# Patient Record
Sex: FEMALE | NOT HISPANIC OR LATINO | Employment: UNEMPLOYED | URBAN - NONMETROPOLITAN AREA
[De-identification: names, ages, dates, MRNs, and addresses within clinical notes are randomized per-mention and may not be internally consistent; named-entity substitution may affect disease eponyms.]

---

## 2022-02-07 ENCOUNTER — OFFICE VISIT (OUTPATIENT)
Dept: FAMILY MEDICINE | Facility: OTHER | Age: 37
End: 2022-02-07
Attending: FAMILY MEDICINE
Payer: COMMERCIAL

## 2022-02-07 VITALS
DIASTOLIC BLOOD PRESSURE: 62 MMHG | WEIGHT: 181.6 LBS | HEART RATE: 72 BPM | SYSTOLIC BLOOD PRESSURE: 98 MMHG | OXYGEN SATURATION: 98 % | TEMPERATURE: 98.4 F

## 2022-02-07 DIAGNOSIS — N91.2 AMENORRHEA: ICD-10-CM

## 2022-02-07 DIAGNOSIS — F33.1 MODERATE EPISODE OF RECURRENT MAJOR DEPRESSIVE DISORDER (H): ICD-10-CM

## 2022-02-07 DIAGNOSIS — E55.9 HYPOVITAMINOSIS D: ICD-10-CM

## 2022-02-07 DIAGNOSIS — F41.1 GAD (GENERALIZED ANXIETY DISORDER): ICD-10-CM

## 2022-02-07 DIAGNOSIS — R79.89 LOW SERUM PROGESTERONE: ICD-10-CM

## 2022-02-07 DIAGNOSIS — O09.891 SUPERVISION OF OTHER HIGH RISK PREGNANCIES, FIRST TRIMESTER: Primary | ICD-10-CM

## 2022-02-07 DIAGNOSIS — O21.0 MORNING SICKNESS: ICD-10-CM

## 2022-02-07 LAB
HCG UR QL: POSITIVE
PROGEST SERPL-MCNC: 33.3 NG/ML

## 2022-02-07 PROCEDURE — G0463 HOSPITAL OUTPT CLINIC VISIT: HCPCS

## 2022-02-07 PROCEDURE — 99204 OFFICE O/P NEW MOD 45 MIN: CPT | Performed by: FAMILY MEDICINE

## 2022-02-07 PROCEDURE — 81025 URINE PREGNANCY TEST: CPT | Mod: ZL | Performed by: FAMILY MEDICINE

## 2022-02-07 PROCEDURE — 82306 VITAMIN D 25 HYDROXY: CPT | Mod: ZL | Performed by: FAMILY MEDICINE

## 2022-02-07 PROCEDURE — 36415 COLL VENOUS BLD VENIPUNCTURE: CPT | Mod: ZL | Performed by: FAMILY MEDICINE

## 2022-02-07 PROCEDURE — 84144 ASSAY OF PROGESTERONE: CPT | Mod: ZL | Performed by: FAMILY MEDICINE

## 2022-02-07 RX ORDER — LAMOTRIGINE 25 MG/1
TABLET ORAL
COMMUNITY
Start: 2021-03-11 | End: 2022-03-16

## 2022-02-07 RX ORDER — ONDANSETRON 4 MG/1
4 TABLET, ORALLY DISINTEGRATING ORAL EVERY 8 HOURS PRN
Qty: 20 TABLET | Refills: 1 | Status: ON HOLD | OUTPATIENT
Start: 2022-02-07 | End: 2022-08-11

## 2022-02-07 RX ORDER — PRENATAL VIT/IRON FUM/FOLIC AC 27MG-0.8MG
1 TABLET ORAL DAILY
COMMUNITY
End: 2024-02-29

## 2022-02-07 RX ORDER — LAMOTRIGINE 100 MG/1
100 TABLET ORAL DAILY
COMMUNITY
Start: 2021-05-24 | End: 2022-03-16

## 2022-02-07 RX ORDER — QUETIAPINE FUMARATE 50 MG/1
TABLET, EXTENDED RELEASE ORAL
COMMUNITY
Start: 2021-05-24 | End: 2022-08-11

## 2022-02-07 RX ORDER — ESCITALOPRAM OXALATE 5 MG/1
5 TABLET ORAL DAILY
Qty: 30 TABLET | Refills: 1 | Status: ON HOLD | OUTPATIENT
Start: 2022-02-07 | End: 2022-08-11

## 2022-02-07 ASSESSMENT — ANXIETY QUESTIONNAIRES
4. TROUBLE RELAXING: NOT AT ALL
1. FEELING NERVOUS, ANXIOUS, OR ON EDGE: NEARLY EVERY DAY
3. WORRYING TOO MUCH ABOUT DIFFERENT THINGS: NOT AT ALL
5. BEING SO RESTLESS THAT IT IS HARD TO SIT STILL: NOT AT ALL
7. FEELING AFRAID AS IF SOMETHING AWFUL MIGHT HAPPEN: SEVERAL DAYS
6. BECOMING EASILY ANNOYED OR IRRITABLE: NOT AT ALL
2. NOT BEING ABLE TO STOP OR CONTROL WORRYING: SEVERAL DAYS
GAD7 TOTAL SCORE: 5
IF YOU CHECKED OFF ANY PROBLEMS ON THIS QUESTIONNAIRE, HOW DIFFICULT HAVE THESE PROBLEMS MADE IT FOR YOU TO DO YOUR WORK, TAKE CARE OF THINGS AT HOME, OR GET ALONG WITH OTHER PEOPLE: VERY DIFFICULT

## 2022-02-07 ASSESSMENT — LIFESTYLE VARIABLES
HOW OFTEN DO YOU HAVE A DRINK CONTAINING ALCOHOL: MONTHLY OR LESS
HOW MANY STANDARD DRINKS CONTAINING ALCOHOL DO YOU HAVE ON A TYPICAL DAY: 1 OR 2

## 2022-02-07 ASSESSMENT — PATIENT HEALTH QUESTIONNAIRE - PHQ9: SUM OF ALL RESPONSES TO PHQ QUESTIONS 1-9: 13

## 2022-02-07 ASSESSMENT — PAIN SCALES - GENERAL: PAINLEVEL: NO PAIN (0)

## 2022-02-07 NOTE — PROGRESS NOTES
Assessment & Plan     Supervision of other high risk pregnancies, first trimester  11wks GA today  GHULAM 8/29/22  - US OB <14 Weeks w Transvaginal Single; Future    Amenorrhea    - HCG Qual, Urine ([id number]); Future  - HCG Qual, Urine ([id number])    Morning sickness  Was using THC  - ondansetron (ZOFRAN-ODT) 4 MG ODT tab; Take 1 tablet (4 mg) by mouth every 8 hours as needed for nausea    Moderate episode of recurrent major depressive disorder (H)  restart  - escitalopram (LEXAPRO) 5 MG tablet; Take 1 tablet (5 mg) by mouth daily    JESSI (generalized anxiety disorder)    - escitalopram (LEXAPRO) 5 MG tablet; Take 1 tablet (5 mg) by mouth daily    Hypovitaminosis D    - Vitamin D Deficiency; Future  - Vitamin D Deficiency    Low serum progesterone    - Progesterone; Future  - Progesterone    30 minutes spent on the date of the encounter doing chart review, history and exam, documentation and further activities per the note       Tobacco Cessation:   reports that she has been smoking cigarettes. She started smoking about 24 years ago. She has a 8.00 pack-year smoking history. She has never used smokeless tobacco.  Tobacco Cessation Action Plan: Information offered: Patient not interested at this time    Patient was agreeable to this plan and had no further questions.  There are no Patient Instructions on file for this visit.    No follow-ups on file.    Ellne Flannery MD  [facility] - YADIRA Gant is a 36 year old who presents for the following health issues     HPI     Depression and Anxiety     How are you doing with your depression since your last visit? Worsened     How are you doing with your anxiety since your last visit?  Worsened     Are you having other symptoms that might be associated with depression or anxiety? Yes:  Little interest in doing things, fatigue, feeling depressed.     Have you had a significant life event? Grief or Loss     Do you have any concerns with your use of  alcohol or other drugs? No    Social History     Tobacco Use     Smoking status: Not on file     Smokeless tobacco: Not on file   Substance Use Topics     Alcohol use: Not on file     Drug use: Not on file     PHQ 2/7/2022   PHQ-9 Total Score 13   Q9: Thoughts of better off dead/self-harm past 2 weeks Not at all     JESSI-7 SCORE 2/7/2022   Total Score 5     Last PHQ-9 2/7/2022   1.  Little interest or pleasure in doing things 0   2.  Feeling down, depressed, or hopeless 3   3.  Trouble falling or staying asleep, or sleeping too much 3   4.  Feeling tired or having little energy 3   5.  Poor appetite or overeating 3   6.  Feeling bad about yourself 0   7.  Trouble concentrating 1   8.  Moving slowly or restless 0   Q9: Thoughts of better off dead/self-harm past 2 weeks 0   PHQ-9 Total Score 13   Difficulty at work, home, or with people Extremely dIfficult     JESSI-7  2/7/2022   1. Feeling nervous, anxious, or on edge 3   2. Not being able to stop or control worrying 1   3. Worrying too much about different things 0   4. Trouble relaxing 0   5. Being so restless that it is hard to sit still 0   6. Becoming easily annoyed or irritable 0   7. Feeling afraid, as if something awful might happen 1   JESSI-7 Total Score 5   If you checked any problems, how difficult have they made it for you to do your work, take care of things at home, or get along with other people? Very difficult       Suicide Assessment Five-step Evaluation and Treatment (SAFE-T)      Concern - Amenorrhea   Onset: LMP was End of November, exact date unkown.     Review of Systems   Constitutional, HEENT, cardiovascular, pulmonary, GI, , musculoskeletal, neuro, skin, endocrine and psych systems are negative, except as otherwise noted.      Objective    LMP 11/22/2021 (Approximate)   There is no height or weight on file to calculate BMI.  Physical Exam   GENERAL: healthy, alert and no distress  NECK: no adenopathy, no asymmetry, masses, or scars and thyroid  normal to palpation  RESP: lungs clear to auscultation - no rales, rhonchi or wheezes  CV: regular rate and rhythm, normal S1 S2, no S3 or S4, no murmur, click or rub, no peripheral edema and peripheral pulses strong  ABDOMEN: soft, nontender, no hepatosplenomegaly, no masses and bowel sounds normal  MS: no gross musculoskeletal defects noted, no edema  PSYCH: mentation appears normal, affect normal/bright    Results for orders placed or performed in visit on 02/07/22   HCG Qual, Urine ([id number])     Status: Abnormal   Result Value Ref Range    hCG Urine Qualitative Positive (A) Negative

## 2022-02-07 NOTE — NURSING NOTE
Chief Complaint   Patient presents with     Amenorrhea       Initial BP 98/62   Pulse 72   Temp 98.4  F (36.9  C) (Tympanic)   Wt 82.4 kg (181 lb 9.6 oz)   LMP 11/22/2021 (Approximate)   SpO2 98%  There is no height or weight on file to calculate BMI.  Medication Reconciliation: complete  Galen Vidales LPN

## 2022-02-08 LAB — DEPRECATED CALCIDIOL+CALCIFEROL SERPL-MC: 16 UG/L (ref 20–75)

## 2022-02-08 ASSESSMENT — ANXIETY QUESTIONNAIRES: GAD7 TOTAL SCORE: 5

## 2022-02-09 DIAGNOSIS — O09.891 SUPERVISION OF OTHER HIGH RISK PREGNANCIES, FIRST TRIMESTER: Primary | ICD-10-CM

## 2022-02-10 RX ORDER — CHOLECALCIFEROL (VITAMIN D3) 50 MCG
3 TABLET ORAL DAILY
Qty: 90 TABLET | Refills: 1 | Status: SHIPPED | OUTPATIENT
Start: 2022-02-10 | End: 2023-10-10

## 2022-02-10 RX ORDER — PROGESTERONE 100 MG/1
100 CAPSULE ORAL DAILY
Qty: 60 CAPSULE | Refills: 1 | Status: SHIPPED | OUTPATIENT
Start: 2022-02-10 | End: 2022-06-02 | Stop reason: ALTCHOICE

## 2022-03-15 NOTE — PROGRESS NOTES
Have you had or do you currently have:    - Diabetes? No  - Hypertension? No  - Heart disease, mitral valve prolapse, or rheumatic fever?  No  - An autoimmune disease such as lupus or rheumatoid arthritis?  No  - Kidney disease, urinary tract infection?  No  - Epilepsy, seizures, or spells?  No  - Migraine headaches?  No  - Any other neurological problems?  No  - Have you ever been treated for depression?  Yes  - Are you having problems with crying spells or loss of self-esteem?  Yes  - Have you ever required psychiatric care?  Yes  - Have you ever had hepatitis, liver disease, or jaundice?  No  - Have you ever been treated for blood clots in your veins, deep vein thrombosis, inflammation in the veins, thrombosis, phelbitis, pulmonary embolism or varicosities?  No  - Have you had excessive bleeding after surgery or dental work?  No  - Do you bleed more than other women after a cut or scratch?  No  - Do you have a history or anemia?  No  - Have you ever had thyroid problems or take thyroid medication?  No  - Do you have any endocrine problems?  No  - Have you every been in a major accident or suffered serious trauma?  No  - Within the last year, has anyone hit, slapped, kicked, or otherwise hurt you?  No  - In the last year, has anyone forced you to have sex when you didn't want to?  No  - Have you every received a blood transfusion?  No  - Would you refuse a blood transfusion if a doctor judged it to be medically necessary?  No  - Does anyone in your home smoke? Sometimes  - Do you use tobacco products?  Yes, Trying to quit  - Do you drink beer, wine, or hard liquor?  No  - Do you use any of the following: marijuana, speed, cocaine, heroin, hallucinogens, or other drugs?  No  - Is your blood type RH negative?  No  - Have you ever had asthma?  No  - Have you ever had tuberculosis?  No  - Do you have any allergies to drugs or over-the-counter medications?  No  - Allergies: dust mites, aspartame, ethanol, venlafaxine  hydrochloride, sertraline?  No  - Have you had any breast problems?  No  - Have you ever breast-fed?  Yes  - Have you had any gynecological surgical procedures such as cervical conization, LEEP, laser treatment, cryosurgery of the cervix, or a dilatation and curettage, etc?  No  - Have you ever had any other surgical procedures?  No  - Have you ever had any anesthetic complications?  No  - Have you ever had an abnormal pap smear?  Unknown   - Do you have a history of abnormalities of the uterus? Unknown   - Did your mother take ELISE or any other hormones when she was pregnant with you?  Unknown  - Did it take you more than one year to become pregnant?  No  - Have you ever been evaluated or treated for infertility?  No  - Is there a history of medical problems in your family, which you feel might adversely affect your health or pregnancy?  No  - Do you have any other problems we have not asked about which you feel may be important to this pregnancy?  No    Symptoms since last menstrual period  - Do you currently have any of the following symptoms: abdominal pain, blood in the stool or urine, chest pain, shortness of breath, coughing or vomiting up blood, you heart is racing or skipping beats, nausea and vomiting, pain on urination, or vaginal discharge or bleeding?  No    Genetic screening  Has the patient, baby's father, or anyone in either family had:  - Thalassemia (Italian, Greek, Mediterranean, or  background only) and an MCV result less than 80?  No  - Neural tube defect such as meningomyelocele, spina bifida, or anencephaly?  No  - Congential heart defect?  No  - Down's syndrome?  No  - Chris-Sachs disease ( Buddhism, Cajun, Japanese-[place])?  No  - Sickle cell disease or trait () ?  No  - Hemophilia or other inherited problems of blood?  No  - Muscular dystrophy?  No  - Cystic fibrosis?  No  - Orange's chorea?  No  - Mental retardation/autism?  No   If yes, was the person tested for Fragile X?   No  - Any other inherited genetic or chromosomal disorder?  No  - Maternal metabolic disorder (e.g. insulin- dependent diabetes, PKU)?  No  - A child with birth defects not listed above?  No  - Recurrent pregnancy loss, or a stillbirth?  No  - Has the patient had any medications/street drugs/alcohol since her last menstrual period?  No  - Does the patient or baby's father have any other genetic risk?  No    Infection history  - Have you ever been treated for tuberculosis?  No  - Have you every had a positive skin test for tuberculosis?  No  - Do you live with someone who has tuberculosis?  No  - Have you ever been exposed to tuberculosis?  No  - Do you have genital herpes?  No  - Does your partner have genital herpes?  No  - Have you had a rash or viral illness since your last period?  No  - Have you ever had gonorrhea, chlamydia, syphilis, venereal warts, trichomoniasis, pelvic inflammatory disease, or any other sexually transmitted disease?  Yes, Trichomoniasis  - Have you had chicken pox?  Yes  - Have you been vaccinated against chicken pox?  Yes  - Have you had any other infectious diseases?  No

## 2022-03-16 ENCOUNTER — PRENATAL OFFICE VISIT (OUTPATIENT)
Dept: FAMILY MEDICINE | Facility: OTHER | Age: 37
End: 2022-03-16
Attending: FAMILY MEDICINE
Payer: COMMERCIAL

## 2022-03-16 ENCOUNTER — LAB (OUTPATIENT)
Dept: LAB | Facility: OTHER | Age: 37
End: 2022-03-16
Attending: FAMILY MEDICINE
Payer: COMMERCIAL

## 2022-03-16 VITALS
DIASTOLIC BLOOD PRESSURE: 62 MMHG | OXYGEN SATURATION: 98 % | HEIGHT: 65 IN | WEIGHT: 183.8 LBS | TEMPERATURE: 98.1 F | SYSTOLIC BLOOD PRESSURE: 108 MMHG | HEART RATE: 79 BPM | BODY MASS INDEX: 30.62 KG/M2

## 2022-03-16 DIAGNOSIS — O09.522 MULTIGRAVIDA OF ADVANCED MATERNAL AGE IN SECOND TRIMESTER: ICD-10-CM

## 2022-03-16 DIAGNOSIS — F41.1 GAD (GENERALIZED ANXIETY DISORDER): ICD-10-CM

## 2022-03-16 DIAGNOSIS — R79.89 LOW SERUM PROGESTERONE: ICD-10-CM

## 2022-03-16 DIAGNOSIS — O21.0 MORNING SICKNESS: ICD-10-CM

## 2022-03-16 DIAGNOSIS — O09.891 SUPERVISION OF OTHER HIGH RISK PREGNANCIES, FIRST TRIMESTER: Primary | ICD-10-CM

## 2022-03-16 LAB
ABO/RH(D): NORMAL
AMPHETAMINES UR QL: NOT DETECTED
ANTIBODY SCREEN: NEGATIVE
BARBITURATES UR QL SCN: NOT DETECTED
BENZODIAZ UR QL SCN: NOT DETECTED
BUPRENORPHINE UR QL: NOT DETECTED
C TRACH DNA SPEC QL PROBE+SIG AMP: NEGATIVE
CANNABINOIDS UR QL: DETECTED
CLUE CELLS: ABNORMAL
COCAINE UR QL SCN: NOT DETECTED
D-METHAMPHET UR QL: NOT DETECTED
ERYTHROCYTE [DISTWIDTH] IN BLOOD BY AUTOMATED COUNT: 13.7 % (ref 10–15)
HCT VFR BLD AUTO: 42.9 % (ref 35–47)
HGB BLD-MCNC: 13.7 G/DL (ref 11.7–15.7)
MCH RBC QN AUTO: 30.8 PG (ref 26.5–33)
MCHC RBC AUTO-ENTMCNC: 31.9 G/DL (ref 31.5–36.5)
MCV RBC AUTO: 96 FL (ref 78–100)
METHADONE UR QL SCN: NOT DETECTED
N GONORRHOEA DNA SPEC QL NAA+PROBE: NEGATIVE
OPIATES UR QL SCN: NOT DETECTED
OXYCODONE UR QL SCN: NOT DETECTED
PCP UR QL SCN: NOT DETECTED
PLATELET # BLD AUTO: 287 10E3/UL (ref 150–450)
PROGEST SERPL-MCNC: 44.2 NG/ML
PROPOXYPH UR QL: NOT DETECTED
RBC # BLD AUTO: 4.45 10E6/UL (ref 3.8–5.2)
SPECIMEN EXPIRATION DATE: NORMAL
TRICHOMONAS, WET PREP: ABNORMAL
TRICYCLICS UR QL SCN: NOT DETECTED
TSH SERPL DL<=0.005 MIU/L-ACNC: 1.58 MU/L (ref 0.4–4)
WBC # BLD AUTO: 13.3 10E3/UL (ref 4–11)
WBC'S/HIGH POWER FIELD, WET PREP: ABNORMAL
YEAST, WET PREP: ABNORMAL

## 2022-03-16 PROCEDURE — 86850 RBC ANTIBODY SCREEN: CPT

## 2022-03-16 PROCEDURE — 99207 PR FIRST OB VISIT: CPT | Performed by: FAMILY MEDICINE

## 2022-03-16 PROCEDURE — 86787 VARICELLA-ZOSTER ANTIBODY: CPT | Mod: ZL

## 2022-03-16 PROCEDURE — 84144 ASSAY OF PROGESTERONE: CPT | Mod: ZL

## 2022-03-16 PROCEDURE — 86780 TREPONEMA PALLIDUM: CPT | Mod: ZL

## 2022-03-16 PROCEDURE — G0145 SCR C/V CYTO,THINLAYER,RESCR: HCPCS | Mod: ZL | Performed by: FAMILY MEDICINE

## 2022-03-16 PROCEDURE — 36415 COLL VENOUS BLD VENIPUNCTURE: CPT | Mod: ZL

## 2022-03-16 PROCEDURE — 87389 HIV-1 AG W/HIV-1&-2 AB AG IA: CPT | Mod: ZL

## 2022-03-16 PROCEDURE — G0463 HOSPITAL OUTPT CLINIC VISIT: HCPCS

## 2022-03-16 PROCEDURE — 86901 BLOOD TYPING SEROLOGIC RH(D): CPT

## 2022-03-16 PROCEDURE — 87210 SMEAR WET MOUNT SALINE/INK: CPT | Mod: ZL | Performed by: FAMILY MEDICINE

## 2022-03-16 PROCEDURE — 86803 HEPATITIS C AB TEST: CPT | Mod: ZL

## 2022-03-16 PROCEDURE — 85027 COMPLETE CBC AUTOMATED: CPT | Mod: ZL

## 2022-03-16 PROCEDURE — 87591 N.GONORRHOEAE DNA AMP PROB: CPT | Mod: ZL

## 2022-03-16 PROCEDURE — 87491 CHLMYD TRACH DNA AMP PROBE: CPT | Mod: ZL

## 2022-03-16 PROCEDURE — 87086 URINE CULTURE/COLONY COUNT: CPT | Mod: ZL

## 2022-03-16 PROCEDURE — 87624 HPV HI-RISK TYP POOLED RSLT: CPT | Mod: ZL | Performed by: FAMILY MEDICINE

## 2022-03-16 PROCEDURE — 80306 DRUG TEST PRSMV INSTRMNT: CPT | Mod: ZL | Performed by: FAMILY MEDICINE

## 2022-03-16 PROCEDURE — 87340 HEPATITIS B SURFACE AG IA: CPT | Mod: ZL

## 2022-03-16 PROCEDURE — 86762 RUBELLA ANTIBODY: CPT | Mod: ZL

## 2022-03-16 PROCEDURE — 84443 ASSAY THYROID STIM HORMONE: CPT | Mod: ZL

## 2022-03-16 ASSESSMENT — PATIENT HEALTH QUESTIONNAIRE - PHQ9: SUM OF ALL RESPONSES TO PHQ QUESTIONS 1-9: 3

## 2022-03-16 ASSESSMENT — PAIN SCALES - GENERAL: PAINLEVEL: NO PAIN (0)

## 2022-03-16 NOTE — NURSING NOTE
"Chief Complaint   Patient presents with     Prenatal Care       Initial /62 (BP Location: Left arm, Patient Position: Sitting)   Pulse 79   Temp 98.1  F (36.7  C) (Tympanic)   Ht 1.651 m (5' 5\")   Wt 83.4 kg (183 lb 12.8 oz)   LMP 11/22/2021 (Approximate)   SpO2 98%   BMI 30.59 kg/m   Estimated body mass index is 30.59 kg/m  as calculated from the following:    Height as of this encounter: 1.651 m (5' 5\").    Weight as of this encounter: 83.4 kg (183 lb 12.8 oz).  Medication Reconciliation: complete  Jeny Vidales LPN  "

## 2022-03-17 LAB
BACTERIA UR CULT: NORMAL
HBV SURFACE AG SERPL QL IA: NONREACTIVE
HCV AB SERPL QL IA: NONREACTIVE
HIV 1+2 AB+HIV1 P24 AG SERPL QL IA: NONREACTIVE
RUBV IGG SERPL QL IA: 2.01 INDEX
RUBV IGG SERPL QL IA: POSITIVE
T PALLIDUM AB SER QL: NONREACTIVE
VZV IGG SER QL IA: 962.2 INDEX
VZV IGG SER QL IA: POSITIVE
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
## 2022-03-18 ENCOUNTER — TRANSFERRED RECORDS (OUTPATIENT)
Dept: HEALTH INFORMATION MANAGEMENT | Facility: CLINIC | Age: 37
End: 2022-03-18

## 2022-03-21 LAB
BKR LAB AP GYN ADEQUACY: NORMAL
BKR LAB AP GYN INTERPRETATION: NORMAL
BKR LAB AP HPV REFLEX: NORMAL
BKR LAB AP PREVIOUS ABNORMAL: NORMAL
PATH REPORT.COMMENTS IMP SPEC: NORMAL
PATH REPORT.COMMENTS IMP SPEC: NORMAL
PATH REPORT.RELEVANT HX SPEC: NORMAL

## 2022-03-23 LAB
HUMAN PAPILLOMA VIRUS 16 DNA: NEGATIVE
HUMAN PAPILLOMA VIRUS 18 DNA: NEGATIVE
HUMAN PAPILLOMA VIRUS FINAL DIAGNOSIS: NORMAL
HUMAN PAPILLOMA VIRUS OTHER HR: NEGATIVE

## 2022-04-15 ENCOUNTER — PRENATAL OFFICE VISIT (OUTPATIENT)
Dept: FAMILY MEDICINE | Facility: OTHER | Age: 37
End: 2022-04-15
Attending: FAMILY MEDICINE
Payer: COMMERCIAL

## 2022-04-15 VITALS
BODY MASS INDEX: 33.05 KG/M2 | TEMPERATURE: 96.8 F | DIASTOLIC BLOOD PRESSURE: 60 MMHG | OXYGEN SATURATION: 98 % | SYSTOLIC BLOOD PRESSURE: 110 MMHG | HEIGHT: 65 IN | HEART RATE: 76 BPM | WEIGHT: 198.4 LBS

## 2022-04-15 DIAGNOSIS — O09.892 SUPERVISION OF OTHER HIGH RISK PREGNANCIES, SECOND TRIMESTER: Primary | ICD-10-CM

## 2022-04-15 PROCEDURE — G0463 HOSPITAL OUTPT CLINIC VISIT: HCPCS

## 2022-04-15 PROCEDURE — 99207 PR COMPLICATED OB VISIT: CPT | Performed by: FAMILY MEDICINE

## 2022-04-15 ASSESSMENT — PAIN SCALES - GENERAL: PAINLEVEL: NO PAIN (0)

## 2022-04-15 NOTE — NURSING NOTE
"No chief complaint on file.      Initial /60 (BP Location: Left arm, Patient Position: Sitting, Cuff Size: Adult Regular)   Pulse 76   Temp 96.8  F (36  C) (Tympanic)   Ht 1.651 m (5' 5\")   Wt 90 kg (198 lb 6.4 oz)   LMP 11/22/2021 (Approximate)   SpO2 98%   BMI 33.02 kg/m   Estimated body mass index is 33.02 kg/m  as calculated from the following:    Height as of this encounter: 1.651 m (5' 5\").    Weight as of this encounter: 90 kg (198 lb 6.4 oz).  Medication Reconciliation: complete  Jeny Vidales LPN    "

## 2022-04-29 ENCOUNTER — HOSPITAL ENCOUNTER (OUTPATIENT)
Dept: ULTRASOUND IMAGING | Facility: HOSPITAL | Age: 37
Discharge: HOME | End: 2022-04-29
Attending: FAMILY MEDICINE | Admitting: FAMILY MEDICINE
Payer: COMMERCIAL

## 2022-04-29 DIAGNOSIS — O09.892 SUPERVISION OF OTHER HIGH RISK PREGNANCIES, SECOND TRIMESTER: ICD-10-CM

## 2022-04-29 PROCEDURE — 76816 OB US FOLLOW-UP PER FETUS: CPT

## 2022-05-11 ENCOUNTER — PRENATAL OFFICE VISIT (OUTPATIENT)
Dept: FAMILY MEDICINE | Facility: OTHER | Age: 37
End: 2022-05-11
Attending: FAMILY MEDICINE
Payer: COMMERCIAL

## 2022-05-11 VITALS
TEMPERATURE: 97.6 F | HEIGHT: 65 IN | WEIGHT: 203.2 LBS | DIASTOLIC BLOOD PRESSURE: 62 MMHG | OXYGEN SATURATION: 98 % | SYSTOLIC BLOOD PRESSURE: 106 MMHG | HEART RATE: 73 BPM | BODY MASS INDEX: 33.85 KG/M2

## 2022-05-11 DIAGNOSIS — O09.892 SUPERVISION OF OTHER HIGH RISK PREGNANCIES, SECOND TRIMESTER: Primary | ICD-10-CM

## 2022-05-11 DIAGNOSIS — O09.522 ADVANCED MATERNAL AGE IN MULTIGRAVIDA, SECOND TRIMESTER: ICD-10-CM

## 2022-05-11 PROCEDURE — G0463 HOSPITAL OUTPT CLINIC VISIT: HCPCS

## 2022-05-11 PROCEDURE — 36415 COLL VENOUS BLD VENIPUNCTURE: CPT | Mod: ZL | Performed by: FAMILY MEDICINE

## 2022-05-11 PROCEDURE — 84144 ASSAY OF PROGESTERONE: CPT | Mod: ZL | Performed by: FAMILY MEDICINE

## 2022-05-11 PROCEDURE — 99207 PR COMPLICATED OB VISIT: CPT | Performed by: FAMILY MEDICINE

## 2022-05-11 ASSESSMENT — PAIN SCALES - GENERAL: PAINLEVEL: NO PAIN (0)

## 2022-05-11 NOTE — NURSING NOTE
"Chief Complaint   Patient presents with     Prenatal Care       Initial /62 (BP Location: Right arm, Patient Position: Sitting, Cuff Size: Adult Regular)   Pulse 73   Temp 97.6  F (36.4  C) (Tympanic)   Ht 1.651 m (5' 5\")   Wt 92.2 kg (203 lb 3.2 oz)   LMP 11/22/2021 (Approximate)   SpO2 98%   BMI 33.81 kg/m   Estimated body mass index is 33.81 kg/m  as calculated from the following:    Height as of this encounter: 1.651 m (5' 5\").    Weight as of this encounter: 92.2 kg (203 lb 3.2 oz).  Medication Reconciliation: complete  Jeny Vidales LPN  "

## 2022-05-11 NOTE — PROGRESS NOTES
Patient showed up at 11am, appt was at 10am, was supposed to do gct and cbc today, not done, will do next appt with ob/gyn  Is on progesterone, she took it last night, will get level today anyways  No questions, education done, explained need to transfer to ob/gyn second to late and inconsistant cares  tdap next visit

## 2022-05-12 LAB — PROGEST SERPL-MCNC: 67.6 NG/ML

## 2022-06-02 ENCOUNTER — LAB (OUTPATIENT)
Dept: LAB | Facility: OTHER | Age: 37
End: 2022-06-02
Attending: FAMILY MEDICINE
Payer: COMMERCIAL

## 2022-06-02 ENCOUNTER — PRENATAL OFFICE VISIT (OUTPATIENT)
Dept: OBGYN | Facility: OTHER | Age: 37
End: 2022-06-02
Attending: FAMILY MEDICINE
Payer: COMMERCIAL

## 2022-06-02 VITALS
HEIGHT: 65 IN | SYSTOLIC BLOOD PRESSURE: 110 MMHG | DIASTOLIC BLOOD PRESSURE: 50 MMHG | OXYGEN SATURATION: 97 % | WEIGHT: 200 LBS | BODY MASS INDEX: 33.32 KG/M2 | HEART RATE: 70 BPM

## 2022-06-02 DIAGNOSIS — O09.892 SUPERVISION OF OTHER HIGH RISK PREGNANCIES, SECOND TRIMESTER: ICD-10-CM

## 2022-06-02 DIAGNOSIS — O09.522 ADVANCED MATERNAL AGE IN MULTIGRAVIDA, SECOND TRIMESTER: ICD-10-CM

## 2022-06-02 LAB
ERYTHROCYTE [DISTWIDTH] IN BLOOD BY AUTOMATED COUNT: 14 % (ref 10–15)
GLUCOSE 1H P 50 G GLC PO SERPL-MCNC: 106 MG/DL (ref 70–129)
HCT VFR BLD AUTO: 36.3 % (ref 35–47)
HGB BLD-MCNC: 12 G/DL (ref 11.7–15.7)
MCH RBC QN AUTO: 29.4 PG (ref 26.5–33)
MCHC RBC AUTO-ENTMCNC: 33.1 G/DL (ref 31.5–36.5)
MCV RBC AUTO: 89 FL (ref 78–100)
PLATELET # BLD AUTO: 316 10E3/UL (ref 150–450)
RBC # BLD AUTO: 4.08 10E6/UL (ref 3.8–5.2)
WBC # BLD AUTO: 12.3 10E3/UL (ref 4–11)

## 2022-06-02 PROCEDURE — 99207 PR PRENATAL VISIT: CPT | Performed by: OBSTETRICS & GYNECOLOGY

## 2022-06-02 PROCEDURE — 36415 COLL VENOUS BLD VENIPUNCTURE: CPT | Mod: ZL

## 2022-06-02 PROCEDURE — 82950 GLUCOSE TEST: CPT | Mod: ZL

## 2022-06-02 PROCEDURE — G0463 HOSPITAL OUTPT CLINIC VISIT: HCPCS

## 2022-06-02 PROCEDURE — 85014 HEMATOCRIT: CPT | Mod: ZL

## 2022-06-02 ASSESSMENT — PAIN SCALES - GENERAL: PAINLEVEL: NO PAIN (0)

## 2022-06-02 NOTE — NURSING NOTE
"Chief Complaint   Patient presents with     Prenatal Care     27 weeks 3 days       Initial /50 (BP Location: Left arm, Patient Position: Chair, Cuff Size: Adult Regular)   Pulse 70   Ht 1.651 m (5' 5\")   Wt 90.7 kg (200 lb)   LMP 11/22/2021 (Approximate)   SpO2 97%   BMI 33.28 kg/m   Estimated body mass index is 33.28 kg/m  as calculated from the following:    Height as of this encounter: 1.651 m (5' 5\").    Weight as of this encounter: 90.7 kg (200 lb).  Medication Reconciliation: complete  RADHA SPAIN LPN    "

## 2022-06-17 ENCOUNTER — PRENATAL OFFICE VISIT (OUTPATIENT)
Dept: OBGYN | Facility: OTHER | Age: 37
End: 2022-06-17
Attending: OBSTETRICS & GYNECOLOGY
Payer: COMMERCIAL

## 2022-06-17 VITALS
BODY MASS INDEX: 32.72 KG/M2 | WEIGHT: 196.4 LBS | OXYGEN SATURATION: 97 % | SYSTOLIC BLOOD PRESSURE: 111 MMHG | HEIGHT: 65 IN | DIASTOLIC BLOOD PRESSURE: 58 MMHG | HEART RATE: 88 BPM

## 2022-06-17 DIAGNOSIS — Z30.2 ENCOUNTER FOR STERILIZATION: ICD-10-CM

## 2022-06-17 DIAGNOSIS — O09.892 SUPERVISION OF OTHER HIGH RISK PREGNANCIES, SECOND TRIMESTER: Primary | ICD-10-CM

## 2022-06-17 PROCEDURE — G0463 HOSPITAL OUTPT CLINIC VISIT: HCPCS | Mod: 25 | Performed by: COUNSELOR

## 2022-06-17 PROCEDURE — 90715 TDAP VACCINE 7 YRS/> IM: CPT

## 2022-06-17 PROCEDURE — 90471 IMMUNIZATION ADMIN: CPT

## 2022-06-17 PROCEDURE — 99207 PR COMPLICATED OB VISIT: CPT | Performed by: OBSTETRICS & GYNECOLOGY

## 2022-06-17 PROCEDURE — G0463 HOSPITAL OUTPT CLINIC VISIT: HCPCS | Performed by: COUNSELOR

## 2022-06-17 ASSESSMENT — PAIN SCALES - GENERAL: PAINLEVEL: NO PAIN (0)

## 2022-06-17 NOTE — PROGRESS NOTES
Doing well.  No concerns today.  Discussed kick counts and fetal movement.  RTC in 2 weeks  TDAP done  We reviewed the risks and benefits of tubal ligation/salpingectomy procedures including risks of bleeding, infection, damage to other organs. Risks of tubal failure, and possibility of ectopic pregnancy were also explained. We discussed alternative forms of birth control.  I  Specifically, we discussed an interval  laparoscopic bilateral salpingectomy    We reviewed risks of laparoscopy, specifically risk of bleeding, infection, damage to nerves, blood vessels, bowel and bladder. Discussed recovery period and expected discomfort. She desires to proceed  and will be scheduled accordingly after delivery.  Consent forms reviewed and signed. .     Discussed and delines ASA use for Pre-e prevention  Denies PTL sx, vb, lof  Gagandeep Aponte MD  6/17/2022

## 2022-06-17 NOTE — NURSING NOTE
"Chief Complaint   Patient presents with     Prenatal Care     29 weeks 4 days       Initial /58 (BP Location: Left arm, Patient Position: Sitting, Cuff Size: Adult Regular)   Pulse 88   Ht 1.651 m (5' 5\")   Wt 89.1 kg (196 lb 6.4 oz)   LMP 11/22/2021 (Approximate)   SpO2 97%   BMI 32.68 kg/m   Estimated body mass index is 32.68 kg/m  as calculated from the following:    Height as of this encounter: 1.651 m (5' 5\").    Weight as of this encounter: 89.1 kg (196 lb 6.4 oz).  Medication Reconciliation: complete     Herlinda Luis LPN    "

## 2022-06-28 ENCOUNTER — PRENATAL OFFICE VISIT (OUTPATIENT)
Dept: OBGYN | Facility: OTHER | Age: 37
End: 2022-06-28
Attending: OBSTETRICS & GYNECOLOGY
Payer: COMMERCIAL

## 2022-06-28 VITALS
HEIGHT: 65 IN | SYSTOLIC BLOOD PRESSURE: 106 MMHG | BODY MASS INDEX: 33.32 KG/M2 | WEIGHT: 200 LBS | DIASTOLIC BLOOD PRESSURE: 66 MMHG

## 2022-06-28 DIAGNOSIS — O09.523 AMA (ADVANCED MATERNAL AGE) MULTIGRAVIDA 35+, THIRD TRIMESTER: Primary | ICD-10-CM

## 2022-06-28 PROCEDURE — 99207 PR PRENATAL VISIT: CPT | Performed by: OBSTETRICS & GYNECOLOGY

## 2022-06-28 PROCEDURE — G0463 HOSPITAL OUTPT CLINIC VISIT: HCPCS

## 2022-06-28 ASSESSMENT — PAIN SCALES - GENERAL: PAINLEVEL: NO PAIN (0)

## 2022-06-28 NOTE — NURSING NOTE
"Chief Complaint   Patient presents with     Prenatal Care     31 1/7       Initial /66 (BP Location: Left arm, Patient Position: Sitting, Cuff Size: Adult Large)   Ht 1.651 m (5' 5\")   Wt 90.7 kg (200 lb)   LMP 11/22/2021 (Approximate)   BMI 33.28 kg/m   Estimated body mass index is 33.28 kg/m  as calculated from the following:    Height as of this encounter: 1.651 m (5' 5\").    Weight as of this encounter: 90.7 kg (200 lb).  Medication Reconciliation: complete  OLGA CARR LPN  "

## 2022-06-30 ENCOUNTER — HOSPITAL ENCOUNTER (OUTPATIENT)
Dept: ULTRASOUND IMAGING | Facility: HOSPITAL | Age: 37
Discharge: HOME | End: 2022-06-30
Attending: OBSTETRICS & GYNECOLOGY | Admitting: OBSTETRICS & GYNECOLOGY
Payer: COMMERCIAL

## 2022-06-30 DIAGNOSIS — O09.523 AMA (ADVANCED MATERNAL AGE) MULTIGRAVIDA 35+, THIRD TRIMESTER: ICD-10-CM

## 2022-06-30 PROCEDURE — 76816 OB US FOLLOW-UP PER FETUS: CPT

## 2022-07-13 ENCOUNTER — TELEPHONE (OUTPATIENT)
Dept: OBGYN | Facility: OTHER | Age: 37
End: 2022-07-13

## 2022-07-18 ENCOUNTER — PRENATAL OFFICE VISIT (OUTPATIENT)
Dept: OBGYN | Facility: OTHER | Age: 37
End: 2022-07-18
Attending: NURSE PRACTITIONER
Payer: COMMERCIAL

## 2022-07-18 VITALS
HEART RATE: 75 BPM | WEIGHT: 204.2 LBS | HEIGHT: 65 IN | DIASTOLIC BLOOD PRESSURE: 64 MMHG | BODY MASS INDEX: 34.02 KG/M2 | SYSTOLIC BLOOD PRESSURE: 108 MMHG | OXYGEN SATURATION: 98 %

## 2022-07-18 DIAGNOSIS — O09.893 SUPERVISION OF OTHER HIGH RISK PREGNANCIES, THIRD TRIMESTER: Primary | ICD-10-CM

## 2022-07-18 PROCEDURE — 87653 STREP B DNA AMP PROBE: CPT | Mod: ZL | Performed by: NURSE PRACTITIONER

## 2022-07-18 PROCEDURE — 99207 PR PRENATAL VISIT: CPT | Performed by: NURSE PRACTITIONER

## 2022-07-18 PROCEDURE — G0463 HOSPITAL OUTPT CLINIC VISIT: HCPCS | Performed by: COUNSELOR

## 2022-07-18 ASSESSMENT — PAIN SCALES - GENERAL: PAINLEVEL: NO PAIN (0)

## 2022-07-18 NOTE — NURSING NOTE
"Chief Complaint   Patient presents with     Prenatal Care     36 weeks 6 days       Initial /64 (BP Location: Right arm, Patient Position: Sitting, Cuff Size: Adult Regular)   Pulse 75   Ht 1.651 m (5' 5\")   Wt 92.6 kg (204 lb 3.2 oz)   LMP 11/21/2021   SpO2 98%   BMI 33.98 kg/m   Estimated body mass index is 33.98 kg/m  as calculated from the following:    Height as of this encounter: 1.651 m (5' 5\").    Weight as of this encounter: 92.6 kg (204 lb 3.2 oz).  Medication Reconciliation: complete     Herlinda Luis LPN    "

## 2022-07-18 NOTE — PROGRESS NOTES
Doing well.  Denies concerns.  No VB or scot. Baby active.  GBS done. BPP later this week.  Signs of labor reviewed and when to come in to [facility].  Return in 1 week.

## 2022-07-19 LAB — GP B STREP DNA SPEC QL NAA+PROBE: NEGATIVE

## 2022-07-21 ENCOUNTER — HOSPITAL ENCOUNTER (EMERGENCY)
Facility: HOSPITAL | Age: 37
End: 2022-07-21
Payer: COMMERCIAL

## 2022-07-21 ENCOUNTER — HOSPITAL ENCOUNTER (OUTPATIENT)
Dept: ULTRASOUND IMAGING | Facility: HOSPITAL | Age: 37
Discharge: HOME | End: 2022-07-21
Attending: OBSTETRICS & GYNECOLOGY | Admitting: OBSTETRICS & GYNECOLOGY
Payer: COMMERCIAL

## 2022-07-21 DIAGNOSIS — O09.523 AMA (ADVANCED MATERNAL AGE) MULTIGRAVIDA 35+, THIRD TRIMESTER: ICD-10-CM

## 2022-07-21 PROCEDURE — 76819 FETAL BIOPHYS PROFIL W/O NST: CPT

## 2022-07-26 ENCOUNTER — HOSPITAL ENCOUNTER (OUTPATIENT)
Dept: ULTRASOUND IMAGING | Facility: HOSPITAL | Age: 37
Discharge: HOME | End: 2022-07-26
Attending: OBSTETRICS & GYNECOLOGY | Admitting: OBSTETRICS & GYNECOLOGY
Payer: COMMERCIAL

## 2022-07-26 DIAGNOSIS — O09.523 AMA (ADVANCED MATERNAL AGE) MULTIGRAVIDA 35+, THIRD TRIMESTER: ICD-10-CM

## 2022-07-26 PROCEDURE — 76819 FETAL BIOPHYS PROFIL W/O NST: CPT

## 2022-08-02 ENCOUNTER — HOSPITAL ENCOUNTER (OUTPATIENT)
Dept: ULTRASOUND IMAGING | Facility: HOSPITAL | Age: 37
Discharge: HOME | End: 2022-08-02
Attending: OBSTETRICS & GYNECOLOGY
Payer: COMMERCIAL

## 2022-08-02 ENCOUNTER — PRENATAL OFFICE VISIT (OUTPATIENT)
Dept: OBGYN | Facility: OTHER | Age: 37
End: 2022-08-02
Attending: OBSTETRICS & GYNECOLOGY
Payer: COMMERCIAL

## 2022-08-02 VITALS
OXYGEN SATURATION: 97 % | DIASTOLIC BLOOD PRESSURE: 60 MMHG | SYSTOLIC BLOOD PRESSURE: 110 MMHG | HEART RATE: 72 BPM | BODY MASS INDEX: 34.49 KG/M2 | WEIGHT: 207 LBS | HEIGHT: 65 IN

## 2022-08-02 DIAGNOSIS — O09.523 AMA (ADVANCED MATERNAL AGE) MULTIGRAVIDA 35+, THIRD TRIMESTER: Primary | ICD-10-CM

## 2022-08-02 DIAGNOSIS — O09.523 AMA (ADVANCED MATERNAL AGE) MULTIGRAVIDA 35+, THIRD TRIMESTER: ICD-10-CM

## 2022-08-02 PROCEDURE — 76819 FETAL BIOPHYS PROFIL W/O NST: CPT

## 2022-08-02 PROCEDURE — 99207 PR PRENATAL VISIT: CPT | Performed by: OBSTETRICS & GYNECOLOGY

## 2022-08-02 PROCEDURE — G0463 HOSPITAL OUTPT CLINIC VISIT: HCPCS | Mod: 25

## 2022-08-02 ASSESSMENT — PAIN SCALES - GENERAL: PAINLEVEL: NO PAIN (0)

## 2022-08-02 NOTE — NURSING NOTE
"Chief Complaint   Patient presents with     Prenatal Care     39 weeks       Initial /60 (BP Location: Left arm, Patient Position: Chair, Cuff Size: Adult Regular)   Pulse 72   Ht 1.651 m (5' 5\")   Wt 93.9 kg (207 lb)   LMP 11/21/2021   SpO2 97%   BMI 34.45 kg/m   Estimated body mass index is 34.45 kg/m  as calculated from the following:    Height as of this encounter: 1.651 m (5' 5\").    Weight as of this encounter: 93.9 kg (207 lb).  Medication Reconciliation: complete  RADHA SPAIN LPN    "

## 2022-08-03 RX ORDER — OXYTOCIN/0.9 % SODIUM CHLORIDE 30/500 ML
100-340 PLASTIC BAG, INJECTION (ML) INTRAVENOUS CONTINUOUS PRN
Status: CANCELLED | OUTPATIENT
Start: 2022-08-03

## 2022-08-03 RX ORDER — NALOXONE HYDROCHLORIDE 0.4 MG/ML
0.4 INJECTION, SOLUTION INTRAMUSCULAR; INTRAVENOUS; SUBCUTANEOUS
Status: CANCELLED | OUTPATIENT
Start: 2022-08-03

## 2022-08-03 RX ORDER — MISOPROSTOL 200 UG/1
400 TABLET ORAL
Status: CANCELLED | OUTPATIENT
Start: 2022-08-03

## 2022-08-03 RX ORDER — NALOXONE HYDROCHLORIDE 0.4 MG/ML
0.2 INJECTION, SOLUTION INTRAMUSCULAR; INTRAVENOUS; SUBCUTANEOUS
Status: CANCELLED | OUTPATIENT
Start: 2022-08-03

## 2022-08-03 RX ORDER — PROCHLORPERAZINE 25 MG
25 SUPPOSITORY, RECTAL RECTAL EVERY 12 HOURS PRN
Status: CANCELLED | OUTPATIENT
Start: 2022-08-03

## 2022-08-03 RX ORDER — TRANEXAMIC ACID 10 MG/ML
1 INJECTION, SOLUTION INTRAVENOUS EVERY 30 MIN PRN
Status: CANCELLED | OUTPATIENT
Start: 2022-08-03

## 2022-08-03 RX ORDER — KETOROLAC TROMETHAMINE 30 MG/ML
30 INJECTION, SOLUTION INTRAMUSCULAR; INTRAVENOUS
Status: CANCELLED | OUTPATIENT
Start: 2022-08-03 | End: 2022-08-08

## 2022-08-03 RX ORDER — ONDANSETRON 2 MG/ML
4 INJECTION INTRAMUSCULAR; INTRAVENOUS EVERY 6 HOURS PRN
Status: CANCELLED | OUTPATIENT
Start: 2022-08-03

## 2022-08-03 RX ORDER — METOCLOPRAMIDE HYDROCHLORIDE 5 MG/ML
10 INJECTION INTRAMUSCULAR; INTRAVENOUS EVERY 6 HOURS PRN
Status: CANCELLED | OUTPATIENT
Start: 2022-08-03

## 2022-08-03 RX ORDER — FENTANYL CITRATE 50 UG/ML
100 INJECTION, SOLUTION INTRAMUSCULAR; INTRAVENOUS
Status: CANCELLED | OUTPATIENT
Start: 2022-08-03

## 2022-08-03 RX ORDER — METOCLOPRAMIDE 10 MG/1
10 TABLET ORAL EVERY 6 HOURS PRN
Status: CANCELLED | OUTPATIENT
Start: 2022-08-03

## 2022-08-03 RX ORDER — OXYTOCIN/0.9 % SODIUM CHLORIDE 30/500 ML
1-24 PLASTIC BAG, INJECTION (ML) INTRAVENOUS CONTINUOUS
Status: CANCELLED | OUTPATIENT
Start: 2022-08-03

## 2022-08-03 RX ORDER — ACETAMINOPHEN 325 MG/1
650 TABLET ORAL EVERY 4 HOURS PRN
Status: CANCELLED | OUTPATIENT
Start: 2022-08-03

## 2022-08-03 RX ORDER — OXYTOCIN/0.9 % SODIUM CHLORIDE 30/500 ML
340 PLASTIC BAG, INJECTION (ML) INTRAVENOUS CONTINUOUS PRN
Status: CANCELLED | OUTPATIENT
Start: 2022-08-03

## 2022-08-03 RX ORDER — OXYTOCIN 10 [USP'U]/ML
10 INJECTION, SOLUTION INTRAMUSCULAR; INTRAVENOUS
Status: CANCELLED | OUTPATIENT
Start: 2022-08-03

## 2022-08-03 RX ORDER — LIDOCAINE 40 MG/G
CREAM TOPICAL
Status: CANCELLED | OUTPATIENT
Start: 2022-08-03

## 2022-08-03 RX ORDER — CARBOPROST TROMETHAMINE 250 UG/ML
250 INJECTION, SOLUTION INTRAMUSCULAR
Status: CANCELLED | OUTPATIENT
Start: 2022-08-03

## 2022-08-03 RX ORDER — OXYCODONE HYDROCHLORIDE 5 MG/1
5 TABLET ORAL
Status: CANCELLED | OUTPATIENT
Start: 2022-08-03

## 2022-08-03 RX ORDER — ONDANSETRON 4 MG/1
4 TABLET, ORALLY DISINTEGRATING ORAL EVERY 6 HOURS PRN
Status: CANCELLED | OUTPATIENT
Start: 2022-08-03

## 2022-08-03 RX ORDER — CITRIC ACID/SODIUM CITRATE 334-500MG
30 SOLUTION, ORAL ORAL
Status: CANCELLED | OUTPATIENT
Start: 2022-08-03

## 2022-08-03 RX ORDER — SODIUM CHLORIDE, SODIUM LACTATE, POTASSIUM CHLORIDE, CALCIUM CHLORIDE 600; 310; 30; 20 MG/100ML; MG/100ML; MG/100ML; MG/100ML
INJECTION, SOLUTION INTRAVENOUS CONTINUOUS PRN
Status: CANCELLED | OUTPATIENT
Start: 2022-08-03

## 2022-08-03 RX ORDER — PROCHLORPERAZINE MALEATE 10 MG
10 TABLET ORAL EVERY 6 HOURS PRN
Status: CANCELLED | OUTPATIENT
Start: 2022-08-03

## 2022-08-03 RX ORDER — IBUPROFEN 200 MG
800 TABLET ORAL
Status: CANCELLED | OUTPATIENT
Start: 2022-08-03 | End: 2022-08-08

## 2022-08-03 RX ORDER — METHYLERGONOVINE MALEATE 0.2 MG/ML
200 INJECTION INTRAVENOUS
Status: CANCELLED | OUTPATIENT
Start: 2022-08-03

## 2022-08-03 NOTE — PROGRESS NOTES
Doing well.  No concerns today.  BPP 8/8 today  Discussed indications, risks, complications and failure rate of inductions. Pt desires to proceed if no labor next week.  Scheduled 8/9/22 Pitocin IOL.   Discussed signs of labor and when to call or come in.  Discussed kick counts and fetal movement.  Denies regular contractions, vaginal bleeding, VENITA Aponte MD  8/3/2022

## 2022-08-09 ENCOUNTER — ANESTHESIA EVENT (OUTPATIENT)
Dept: SURGERY | Facility: HOSPITAL | Age: 37
End: 2022-08-09
Payer: COMMERCIAL

## 2022-08-09 ENCOUNTER — ANESTHESIA (OUTPATIENT)
Dept: SURGERY | Facility: HOSPITAL | Age: 37
End: 2022-08-09
Payer: COMMERCIAL

## 2022-08-09 ENCOUNTER — HOSPITAL ENCOUNTER (INPATIENT)
Facility: HOSPITAL | Age: 37
LOS: 2 days | Discharge: HOME | End: 2022-08-11
Attending: OBSTETRICS & GYNECOLOGY | Admitting: OBSTETRICS & GYNECOLOGY
Payer: COMMERCIAL

## 2022-08-09 DIAGNOSIS — F33.1 MODERATE EPISODE OF RECURRENT MAJOR DEPRESSIVE DISORDER (H): ICD-10-CM

## 2022-08-09 DIAGNOSIS — O45.93 PLACENTAL ABRUPTION IN THIRD TRIMESTER: ICD-10-CM

## 2022-08-09 DIAGNOSIS — F41.1 GAD (GENERALIZED ANXIETY DISORDER): ICD-10-CM

## 2022-08-09 LAB
ABO/RH(D): NORMAL
AMPHETAMINES UR QL: NOT DETECTED
ANTIBODY SCREEN: NEGATIVE
APTT PPP: 26 SECONDS (ref 22–38)
BARBITURATES UR QL SCN: NOT DETECTED
BASOPHILS # BLD AUTO: 0 10E3/UL (ref 0–0.2)
BASOPHILS NFR BLD AUTO: 0 %
BENZODIAZ UR QL SCN: NOT DETECTED
BUPRENORPHINE UR QL: NOT DETECTED
CANNABINOIDS UR QL: DETECTED
COCAINE UR QL SCN: NOT DETECTED
CREAT SERPL-MCNC: 0.53 MG/DL (ref 0.52–1.04)
CREAT UR-MCNC: 15 MG/DL
D-METHAMPHET UR QL: NOT DETECTED
EOSINOPHIL # BLD AUTO: 0.2 10E3/UL (ref 0–0.7)
EOSINOPHIL NFR BLD AUTO: 2 %
ERYTHROCYTE [DISTWIDTH] IN BLOOD BY AUTOMATED COUNT: 15.4 % (ref 10–15)
ERYTHROCYTE [DISTWIDTH] IN BLOOD BY AUTOMATED COUNT: 15.4 % (ref 10–15)
GFR SERPL CREATININE-BSD FRML MDRD: >90 ML/MIN/1.73M2
HCT VFR BLD AUTO: 31.2 % (ref 35–47)
HCT VFR BLD AUTO: 36.4 % (ref 35–47)
HGB BLD-MCNC: 10.4 G/DL (ref 11.7–15.7)
HGB BLD-MCNC: 12.2 G/DL (ref 11.7–15.7)
HOLD SPECIMEN: NORMAL
HOLD SPECIMEN: NORMAL
IMM GRANULOCYTES # BLD: 0.1 10E3/UL
IMM GRANULOCYTES NFR BLD: 1 %
INR PPP: 1.04 (ref 0.85–1.15)
LYMPHOCYTES # BLD AUTO: 2.8 10E3/UL (ref 0.8–5.3)
LYMPHOCYTES NFR BLD AUTO: 22 %
MCH RBC QN AUTO: 29 PG (ref 26.5–33)
MCH RBC QN AUTO: 29.3 PG (ref 26.5–33)
MCHC RBC AUTO-ENTMCNC: 33.3 G/DL (ref 31.5–36.5)
MCHC RBC AUTO-ENTMCNC: 33.5 G/DL (ref 31.5–36.5)
MCV RBC AUTO: 87 FL (ref 78–100)
MCV RBC AUTO: 88 FL (ref 78–100)
METHADONE UR QL SCN: NOT DETECTED
MONOCYTES # BLD AUTO: 1.1 10E3/UL (ref 0–1.3)
MONOCYTES NFR BLD AUTO: 8 %
NEUTROPHILS # BLD AUTO: 8.7 10E3/UL (ref 1.6–8.3)
NEUTROPHILS NFR BLD AUTO: 67 %
NRBC # BLD AUTO: 0 10E3/UL
NRBC BLD AUTO-RTO: 0 /100
OPIATES UR QL SCN: NOT DETECTED
OXYCODONE UR QL SCN: NOT DETECTED
PCP UR QL SCN: NOT DETECTED
PLATELET # BLD AUTO: 250 10E3/UL (ref 150–450)
PLATELET # BLD AUTO: 279 10E3/UL (ref 150–450)
PROPOXYPH UR QL: NOT DETECTED
RBC # BLD AUTO: 3.55 10E6/UL (ref 3.8–5.2)
RBC # BLD AUTO: 4.2 10E6/UL (ref 3.8–5.2)
SARS-COV-2 RNA RESP QL NAA+PROBE: NEGATIVE
SPECIMEN EXPIRATION DATE: NORMAL
TRICYCLICS UR QL SCN: NOT DETECTED
WBC # BLD AUTO: 12.9 10E3/UL (ref 4–11)
WBC # BLD AUTO: 26.3 10E3/UL (ref 4–11)

## 2022-08-09 PROCEDURE — 88307 TISSUE EXAM BY PATHOLOGIST: CPT | Mod: 26 | Performed by: PATHOLOGY

## 2022-08-09 PROCEDURE — 86901 BLOOD TYPING SEROLOGIC RH(D): CPT | Performed by: OBSTETRICS & GYNECOLOGY

## 2022-08-09 PROCEDURE — 370N000017 HC ANESTHESIA TECHNICAL FEE, PER MIN: Performed by: OBSTETRICS & GYNECOLOGY

## 2022-08-09 PROCEDURE — 85730 THROMBOPLASTIN TIME PARTIAL: CPT | Performed by: OBSTETRICS & GYNECOLOGY

## 2022-08-09 PROCEDURE — 258N000003 HC RX IP 258 OP 636: Performed by: NURSE ANESTHETIST, CERTIFIED REGISTERED

## 2022-08-09 PROCEDURE — 80306 DRUG TEST PRSMV INSTRMNT: CPT | Performed by: OBSTETRICS & GYNECOLOGY

## 2022-08-09 PROCEDURE — 59510 CESAREAN DELIVERY: CPT | Performed by: OBSTETRICS & GYNECOLOGY

## 2022-08-09 PROCEDURE — 88307 TISSUE EXAM BY PATHOLOGIST: CPT | Mod: TC | Performed by: OBSTETRICS & GYNECOLOGY

## 2022-08-09 PROCEDURE — 3E0R3NZ INTRODUCTION OF ANALGESICS, HYPNOTICS, SEDATIVES INTO SPINAL CANAL, PERCUTANEOUS APPROACH: ICD-10-PCS | Performed by: OBSTETRICS & GYNECOLOGY

## 2022-08-09 PROCEDURE — 258N000003 HC RX IP 258 OP 636: Performed by: OBSTETRICS & GYNECOLOGY

## 2022-08-09 PROCEDURE — 120N000001 HC R&B MED SURG/OB

## 2022-08-09 PROCEDURE — 272N000001 HC OR GENERAL SUPPLY STERILE: Performed by: OBSTETRICS & GYNECOLOGY

## 2022-08-09 PROCEDURE — 59514 CESAREAN DELIVERY ONLY: CPT | Mod: AS | Performed by: NURSE PRACTITIONER

## 2022-08-09 PROCEDURE — 250N000013 HC RX MED GY IP 250 OP 250 PS 637: Performed by: OBSTETRICS & GYNECOLOGY

## 2022-08-09 PROCEDURE — 64488 TAP BLOCK BI INJECTION: CPT | Mod: XU | Performed by: NURSE ANESTHETIST, CERTIFIED REGISTERED

## 2022-08-09 PROCEDURE — 250N000013 HC RX MED GY IP 250 OP 250 PS 637: Performed by: NURSE ANESTHETIST, CERTIFIED REGISTERED

## 2022-08-09 PROCEDURE — C9290 INJ, BUPIVACAINE LIPOSOME: HCPCS | Performed by: NURSE ANESTHETIST, CERTIFIED REGISTERED

## 2022-08-09 PROCEDURE — 86850 RBC ANTIBODY SCREEN: CPT | Performed by: OBSTETRICS & GYNECOLOGY

## 2022-08-09 PROCEDURE — 86780 TREPONEMA PALLIDUM: CPT | Performed by: OBSTETRICS & GYNECOLOGY

## 2022-08-09 PROCEDURE — 250N000011 HC RX IP 250 OP 636: Performed by: OBSTETRICS & GYNECOLOGY

## 2022-08-09 PROCEDURE — 360N000076 HC SURGERY LEVEL 3, PER MIN: Performed by: OBSTETRICS & GYNECOLOGY

## 2022-08-09 PROCEDURE — 250N000009 HC RX 250: Performed by: OBSTETRICS & GYNECOLOGY

## 2022-08-09 PROCEDURE — 250N000009 HC RX 250: Performed by: NURSE ANESTHETIST, CERTIFIED REGISTERED

## 2022-08-09 PROCEDURE — 59514 CESAREAN DELIVERY ONLY: CPT | Performed by: NURSE ANESTHETIST, CERTIFIED REGISTERED

## 2022-08-09 PROCEDURE — 85027 COMPLETE CBC AUTOMATED: CPT | Performed by: OBSTETRICS & GYNECOLOGY

## 2022-08-09 PROCEDURE — 82565 ASSAY OF CREATININE: CPT | Performed by: OBSTETRICS & GYNECOLOGY

## 2022-08-09 PROCEDURE — 250N000011 HC RX IP 250 OP 636: Performed by: NURSE ANESTHETIST, CERTIFIED REGISTERED

## 2022-08-09 PROCEDURE — 36415 COLL VENOUS BLD VENIPUNCTURE: CPT | Performed by: OBSTETRICS & GYNECOLOGY

## 2022-08-09 PROCEDURE — 85610 PROTHROMBIN TIME: CPT | Performed by: OBSTETRICS & GYNECOLOGY

## 2022-08-09 PROCEDURE — 85025 COMPLETE CBC W/AUTO DIFF WBC: CPT | Performed by: OBSTETRICS & GYNECOLOGY

## 2022-08-09 PROCEDURE — 80349 CANNABINOIDS NATURAL: CPT | Performed by: OBSTETRICS & GYNECOLOGY

## 2022-08-09 PROCEDURE — 710N000010 HC RECOVERY PHASE 1, LEVEL 2, PER MIN: Performed by: OBSTETRICS & GYNECOLOGY

## 2022-08-09 PROCEDURE — U0005 INFEC AGEN DETEC AMPLI PROBE: HCPCS | Performed by: OBSTETRICS & GYNECOLOGY

## 2022-08-09 RX ORDER — MEPERIDINE HYDROCHLORIDE 25 MG/ML
12.5 INJECTION INTRAMUSCULAR; INTRAVENOUS; SUBCUTANEOUS
Status: DISCONTINUED | OUTPATIENT
Start: 2022-08-09 | End: 2022-08-11 | Stop reason: HOSPADM

## 2022-08-09 RX ORDER — HYDRALAZINE HYDROCHLORIDE 20 MG/ML
2.5-5 INJECTION INTRAMUSCULAR; INTRAVENOUS EVERY 10 MIN PRN
Status: DISCONTINUED | OUTPATIENT
Start: 2022-08-09 | End: 2022-08-09 | Stop reason: HOSPADM

## 2022-08-09 RX ORDER — KETOROLAC TROMETHAMINE 30 MG/ML
30 INJECTION, SOLUTION INTRAMUSCULAR; INTRAVENOUS
Status: DISCONTINUED | OUTPATIENT
Start: 2022-08-09 | End: 2022-08-09

## 2022-08-09 RX ORDER — METOCLOPRAMIDE HYDROCHLORIDE 5 MG/ML
10 INJECTION INTRAMUSCULAR; INTRAVENOUS EVERY 6 HOURS PRN
Status: DISCONTINUED | OUTPATIENT
Start: 2022-08-09 | End: 2022-08-11 | Stop reason: HOSPADM

## 2022-08-09 RX ORDER — HYDROMORPHONE HYDROCHLORIDE 1 MG/ML
0.4 INJECTION, SOLUTION INTRAMUSCULAR; INTRAVENOUS; SUBCUTANEOUS EVERY 5 MIN PRN
Status: DISCONTINUED | OUTPATIENT
Start: 2022-08-09 | End: 2022-08-09 | Stop reason: HOSPADM

## 2022-08-09 RX ORDER — TRANEXAMIC ACID 10 MG/ML
1 INJECTION, SOLUTION INTRAVENOUS EVERY 30 MIN PRN
Status: DISCONTINUED | OUTPATIENT
Start: 2022-08-09 | End: 2022-08-11 | Stop reason: HOSPADM

## 2022-08-09 RX ORDER — BUPIVACAINE HYDROCHLORIDE 2.5 MG/ML
INJECTION, SOLUTION EPIDURAL; INFILTRATION; INTRACAUDAL
Status: COMPLETED | OUTPATIENT
Start: 2022-08-09 | End: 2022-08-09

## 2022-08-09 RX ORDER — OXYTOCIN/0.9 % SODIUM CHLORIDE 30/500 ML
PLASTIC BAG, INJECTION (ML) INTRAVENOUS CONTINUOUS PRN
Status: DISCONTINUED | OUTPATIENT
Start: 2022-08-09 | End: 2022-08-09

## 2022-08-09 RX ORDER — FENTANYL CITRATE-0.9 % NACL/PF 10 MCG/ML
100 PLASTIC BAG, INJECTION (ML) INTRAVENOUS EVERY 5 MIN PRN
Status: DISCONTINUED | OUTPATIENT
Start: 2022-08-09 | End: 2022-08-09 | Stop reason: HOSPADM

## 2022-08-09 RX ORDER — METHYLERGONOVINE MALEATE 0.2 MG/ML
INJECTION INTRAVENOUS PRN
Status: DISCONTINUED | OUTPATIENT
Start: 2022-08-09 | End: 2022-08-09

## 2022-08-09 RX ORDER — ONDANSETRON 4 MG/1
4 TABLET, ORALLY DISINTEGRATING ORAL EVERY 6 HOURS PRN
Status: DISCONTINUED | OUTPATIENT
Start: 2022-08-09 | End: 2022-08-09

## 2022-08-09 RX ORDER — IBUPROFEN 800 MG/1
800 TABLET, FILM COATED ORAL EVERY 6 HOURS
Status: DISCONTINUED | OUTPATIENT
Start: 2022-08-10 | End: 2022-08-10

## 2022-08-09 RX ORDER — PROPOFOL 10 MG/ML
INJECTION, EMULSION INTRAVENOUS PRN
Status: DISCONTINUED | OUTPATIENT
Start: 2022-08-09 | End: 2022-08-09

## 2022-08-09 RX ORDER — CARBOPROST TROMETHAMINE 250 UG/ML
250 INJECTION, SOLUTION INTRAMUSCULAR
Status: DISCONTINUED | OUTPATIENT
Start: 2022-08-09 | End: 2022-08-11 | Stop reason: HOSPADM

## 2022-08-09 RX ORDER — FENTANYL CITRATE 50 UG/ML
50 INJECTION, SOLUTION INTRAMUSCULAR; INTRAVENOUS EVERY 5 MIN PRN
Status: DISCONTINUED | OUTPATIENT
Start: 2022-08-09 | End: 2022-08-09 | Stop reason: HOSPADM

## 2022-08-09 RX ORDER — CARBOPROST TROMETHAMINE 250 UG/ML
250 INJECTION, SOLUTION INTRAMUSCULAR
Status: DISCONTINUED | OUTPATIENT
Start: 2022-08-09 | End: 2022-08-09 | Stop reason: HOSPADM

## 2022-08-09 RX ORDER — ONDANSETRON 2 MG/ML
4 INJECTION INTRAMUSCULAR; INTRAVENOUS EVERY 6 HOURS PRN
Status: DISCONTINUED | OUTPATIENT
Start: 2022-08-09 | End: 2022-08-11 | Stop reason: HOSPADM

## 2022-08-09 RX ORDER — HYDROCORTISONE 25 MG/G
CREAM TOPICAL 3 TIMES DAILY PRN
Status: DISCONTINUED | OUTPATIENT
Start: 2022-08-09 | End: 2022-08-11 | Stop reason: HOSPADM

## 2022-08-09 RX ORDER — TRANEXAMIC ACID 10 MG/ML
1 INJECTION, SOLUTION INTRAVENOUS EVERY 30 MIN PRN
Status: DISCONTINUED | OUTPATIENT
Start: 2022-08-09 | End: 2022-08-09 | Stop reason: HOSPADM

## 2022-08-09 RX ORDER — DEXTROSE, SODIUM CHLORIDE, SODIUM LACTATE, POTASSIUM CHLORIDE, AND CALCIUM CHLORIDE 5; .6; .31; .03; .02 G/100ML; G/100ML; G/100ML; G/100ML; G/100ML
INJECTION, SOLUTION INTRAVENOUS CONTINUOUS
Status: DISCONTINUED | OUTPATIENT
Start: 2022-08-09 | End: 2022-08-11 | Stop reason: HOSPADM

## 2022-08-09 RX ORDER — NALOXONE HYDROCHLORIDE 0.4 MG/ML
0.4 INJECTION, SOLUTION INTRAMUSCULAR; INTRAVENOUS; SUBCUTANEOUS
Status: DISCONTINUED | OUTPATIENT
Start: 2022-08-09 | End: 2022-08-09 | Stop reason: HOSPADM

## 2022-08-09 RX ORDER — PROCHLORPERAZINE 25 MG
25 SUPPOSITORY, RECTAL RECTAL EVERY 12 HOURS PRN
Status: DISCONTINUED | OUTPATIENT
Start: 2022-08-09 | End: 2022-08-09 | Stop reason: HOSPADM

## 2022-08-09 RX ORDER — MISOPROSTOL 200 UG/1
400 TABLET ORAL
Status: DISCONTINUED | OUTPATIENT
Start: 2022-08-09 | End: 2022-08-09 | Stop reason: HOSPADM

## 2022-08-09 RX ORDER — METOCLOPRAMIDE HYDROCHLORIDE 5 MG/ML
10 INJECTION INTRAMUSCULAR; INTRAVENOUS EVERY 6 HOURS PRN
Status: DISCONTINUED | OUTPATIENT
Start: 2022-08-09 | End: 2022-08-09 | Stop reason: HOSPADM

## 2022-08-09 RX ORDER — NALOXONE HYDROCHLORIDE 0.4 MG/ML
0.2 INJECTION, SOLUTION INTRAMUSCULAR; INTRAVENOUS; SUBCUTANEOUS
Status: DISCONTINUED | OUTPATIENT
Start: 2022-08-09 | End: 2022-08-09 | Stop reason: HOSPADM

## 2022-08-09 RX ORDER — OXYTOCIN 10 [USP'U]/ML
10 INJECTION, SOLUTION INTRAMUSCULAR; INTRAVENOUS
Status: DISCONTINUED | OUTPATIENT
Start: 2022-08-09 | End: 2022-08-09 | Stop reason: HOSPADM

## 2022-08-09 RX ORDER — PHENYLEPHRINE HYDROCHLORIDE 10 MG/ML
INJECTION INTRAVENOUS PRN
Status: DISCONTINUED | OUTPATIENT
Start: 2022-08-09 | End: 2022-08-09

## 2022-08-09 RX ORDER — AMOXICILLIN 250 MG
1 CAPSULE ORAL 2 TIMES DAILY
Status: DISCONTINUED | OUTPATIENT
Start: 2022-08-09 | End: 2022-08-11 | Stop reason: HOSPADM

## 2022-08-09 RX ORDER — ONDANSETRON 2 MG/ML
INJECTION INTRAMUSCULAR; INTRAVENOUS PRN
Status: DISCONTINUED | OUTPATIENT
Start: 2022-08-09 | End: 2022-08-09

## 2022-08-09 RX ORDER — KETOROLAC TROMETHAMINE 30 MG/ML
30 INJECTION, SOLUTION INTRAMUSCULAR; INTRAVENOUS
Status: DISCONTINUED | OUTPATIENT
Start: 2022-08-09 | End: 2022-08-11 | Stop reason: HOSPADM

## 2022-08-09 RX ORDER — NALBUPHINE HYDROCHLORIDE 10 MG/ML
2.5-5 INJECTION, SOLUTION INTRAMUSCULAR; INTRAVENOUS; SUBCUTANEOUS EVERY 6 HOURS PRN
Status: DISCONTINUED | OUTPATIENT
Start: 2022-08-09 | End: 2022-08-09

## 2022-08-09 RX ORDER — AMOXICILLIN 250 MG
2 CAPSULE ORAL 2 TIMES DAILY
Status: DISCONTINUED | OUTPATIENT
Start: 2022-08-09 | End: 2022-08-11 | Stop reason: HOSPADM

## 2022-08-09 RX ORDER — FENTANYL CITRATE 50 UG/ML
100 INJECTION, SOLUTION INTRAMUSCULAR; INTRAVENOUS
Status: DISCONTINUED | OUTPATIENT
Start: 2022-08-09 | End: 2022-08-09 | Stop reason: HOSPADM

## 2022-08-09 RX ORDER — PROCHLORPERAZINE 25 MG
25 SUPPOSITORY, RECTAL RECTAL EVERY 12 HOURS PRN
Status: DISCONTINUED | OUTPATIENT
Start: 2022-08-09 | End: 2022-08-11 | Stop reason: HOSPADM

## 2022-08-09 RX ORDER — DIPHENHYDRAMINE HCL 25 MG
25 CAPSULE ORAL EVERY 6 HOURS PRN
Status: DISCONTINUED | OUTPATIENT
Start: 2022-08-09 | End: 2022-08-11 | Stop reason: HOSPADM

## 2022-08-09 RX ORDER — LIDOCAINE 40 MG/G
CREAM TOPICAL
Status: DISCONTINUED | OUTPATIENT
Start: 2022-08-09 | End: 2022-08-09 | Stop reason: HOSPADM

## 2022-08-09 RX ORDER — VITAMIN A ACETATE, .BETA.-CAROTENE, ASCORBIC ACID, CHOLECALCIFEROL, .ALPHA.-TOCOPHEROL ACETATE, DL-, THIAMINE MONONITRATE, RIBOFLAVIN, NIACINAMIDE, PYRIDOXINE HYDROCHLORIDE, FOLIC ACID, CYANOCOBALAMIN, CALCIUM CARBONATE, FERROUS FUMARATE, ZINC OXIDE, AND CUPRIC OXIDE 2000; 2000; 120; 400; 22; 1.84; 3; 20; 10; 1; 12; 200; 27; 25; 2 [IU]/1; [IU]/1; MG/1; [IU]/1; MG/1; MG/1; MG/1; MG/1; MG/1; MG/1; UG/1; MG/1; MG/1; MG/1; MG/1
1 TABLET ORAL DAILY
Status: DISCONTINUED | OUTPATIENT
Start: 2022-08-10 | End: 2022-08-11 | Stop reason: HOSPADM

## 2022-08-09 RX ORDER — OXYTOCIN/0.9 % SODIUM CHLORIDE 30/500 ML
340 PLASTIC BAG, INJECTION (ML) INTRAVENOUS CONTINUOUS PRN
Status: DISCONTINUED | OUTPATIENT
Start: 2022-08-09 | End: 2022-08-09 | Stop reason: HOSPADM

## 2022-08-09 RX ORDER — ACETAMINOPHEN 325 MG/1
650 TABLET ORAL EVERY 4 HOURS PRN
Status: DISCONTINUED | OUTPATIENT
Start: 2022-08-09 | End: 2022-08-09 | Stop reason: HOSPADM

## 2022-08-09 RX ORDER — KETOROLAC TROMETHAMINE 30 MG/ML
30 INJECTION, SOLUTION INTRAMUSCULAR; INTRAVENOUS EVERY 6 HOURS
Status: DISCONTINUED | OUTPATIENT
Start: 2022-08-09 | End: 2022-08-09

## 2022-08-09 RX ORDER — ONDANSETRON 2 MG/ML
4 INJECTION INTRAMUSCULAR; INTRAVENOUS EVERY 6 HOURS PRN
Status: DISCONTINUED | OUTPATIENT
Start: 2022-08-09 | End: 2022-08-09

## 2022-08-09 RX ORDER — ONDANSETRON 4 MG/1
4 TABLET, ORALLY DISINTEGRATING ORAL EVERY 6 HOURS PRN
Status: DISCONTINUED | OUTPATIENT
Start: 2022-08-09 | End: 2022-08-09 | Stop reason: HOSPADM

## 2022-08-09 RX ORDER — OXYTOCIN/0.9 % SODIUM CHLORIDE 30/500 ML
340 PLASTIC BAG, INJECTION (ML) INTRAVENOUS CONTINUOUS PRN
Status: DISCONTINUED | OUTPATIENT
Start: 2022-08-09 | End: 2022-08-11 | Stop reason: HOSPADM

## 2022-08-09 RX ORDER — METOCLOPRAMIDE 10 MG/1
10 TABLET ORAL EVERY 6 HOURS PRN
Status: DISCONTINUED | OUTPATIENT
Start: 2022-08-09 | End: 2022-08-09 | Stop reason: HOSPADM

## 2022-08-09 RX ORDER — FENTANYL CITRATE 0.05 MG/ML
INJECTION, SOLUTION INTRAMUSCULAR; INTRAVENOUS PRN
Status: DISCONTINUED | OUTPATIENT
Start: 2022-08-09 | End: 2022-08-09

## 2022-08-09 RX ORDER — LIDOCAINE HYDROCHLORIDE AND EPINEPHRINE 15; 5 MG/ML; UG/ML
3 INJECTION, SOLUTION EPIDURAL
Status: COMPLETED | OUTPATIENT
Start: 2022-08-09 | End: 2022-08-09

## 2022-08-09 RX ORDER — METHYLERGONOVINE MALEATE 0.2 MG/ML
200 INJECTION INTRAVENOUS
Status: DISCONTINUED | OUTPATIENT
Start: 2022-08-09 | End: 2022-08-09 | Stop reason: HOSPADM

## 2022-08-09 RX ORDER — ALBUTEROL SULFATE 0.83 MG/ML
2.5 SOLUTION RESPIRATORY (INHALATION) EVERY 4 HOURS PRN
Status: DISCONTINUED | OUTPATIENT
Start: 2022-08-09 | End: 2022-08-09 | Stop reason: HOSPADM

## 2022-08-09 RX ORDER — CEFOXITIN SODIUM 2 G/50ML
2 INJECTION, SOLUTION INTRAVENOUS EVERY 6 HOURS
Status: COMPLETED | OUTPATIENT
Start: 2022-08-09 | End: 2022-08-10

## 2022-08-09 RX ORDER — OXYCODONE HYDROCHLORIDE 5 MG/1
5 TABLET ORAL
Status: DISCONTINUED | OUTPATIENT
Start: 2022-08-09 | End: 2022-08-09

## 2022-08-09 RX ORDER — SIMETHICONE 80 MG
80 TABLET,CHEWABLE ORAL 4 TIMES DAILY PRN
Status: DISCONTINUED | OUTPATIENT
Start: 2022-08-09 | End: 2022-08-11 | Stop reason: HOSPADM

## 2022-08-09 RX ORDER — OXYTOCIN/0.9 % SODIUM CHLORIDE 30/500 ML
100-340 PLASTIC BAG, INJECTION (ML) INTRAVENOUS CONTINUOUS PRN
Status: DISCONTINUED | OUTPATIENT
Start: 2022-08-09 | End: 2022-08-09

## 2022-08-09 RX ORDER — METOCLOPRAMIDE 10 MG/1
10 TABLET ORAL EVERY 6 HOURS PRN
Status: DISCONTINUED | OUTPATIENT
Start: 2022-08-09 | End: 2022-08-11 | Stop reason: HOSPADM

## 2022-08-09 RX ORDER — MISOPROSTOL 200 UG/1
400 TABLET ORAL
Status: DISCONTINUED | OUTPATIENT
Start: 2022-08-09 | End: 2022-08-11 | Stop reason: HOSPADM

## 2022-08-09 RX ORDER — CITRIC ACID/SODIUM CITRATE 334-500MG
30 SOLUTION, ORAL ORAL
Status: DISCONTINUED | OUTPATIENT
Start: 2022-08-09 | End: 2022-08-09 | Stop reason: HOSPADM

## 2022-08-09 RX ORDER — OXYTOCIN 10 [USP'U]/ML
10 INJECTION, SOLUTION INTRAMUSCULAR; INTRAVENOUS
Status: DISCONTINUED | OUTPATIENT
Start: 2022-08-09 | End: 2022-08-09

## 2022-08-09 RX ORDER — OXYCODONE HYDROCHLORIDE 5 MG/1
5 TABLET ORAL EVERY 4 HOURS PRN
Status: DISCONTINUED | OUTPATIENT
Start: 2022-08-09 | End: 2022-08-10

## 2022-08-09 RX ORDER — CEFAZOLIN SODIUM 1 G/3ML
INJECTION, POWDER, FOR SOLUTION INTRAMUSCULAR; INTRAVENOUS PRN
Status: DISCONTINUED | OUTPATIENT
Start: 2022-08-09 | End: 2022-08-09

## 2022-08-09 RX ORDER — OXYTOCIN/0.9 % SODIUM CHLORIDE 30/500 ML
1-24 PLASTIC BAG, INJECTION (ML) INTRAVENOUS CONTINUOUS
Status: DISCONTINUED | OUTPATIENT
Start: 2022-08-09 | End: 2022-08-09 | Stop reason: HOSPADM

## 2022-08-09 RX ORDER — BISACODYL 10 MG
10 SUPPOSITORY, RECTAL RECTAL DAILY PRN
Status: DISCONTINUED | OUTPATIENT
Start: 2022-08-11 | End: 2022-08-11 | Stop reason: HOSPADM

## 2022-08-09 RX ORDER — FENTANYL CITRATE 50 UG/ML
100 INJECTION, SOLUTION INTRAMUSCULAR; INTRAVENOUS ONCE
Status: COMPLETED | OUTPATIENT
Start: 2022-08-09 | End: 2022-08-09

## 2022-08-09 RX ORDER — FENTANYL/BUPIVACAINE/NS/PF 2-1250MCG
PLASTIC BAG, INJECTION (ML) INJECTION
Status: COMPLETED
Start: 2022-08-09 | End: 2022-08-09

## 2022-08-09 RX ORDER — MODIFIED LANOLIN
OINTMENT (GRAM) TOPICAL
Status: DISCONTINUED | OUTPATIENT
Start: 2022-08-09 | End: 2022-08-11 | Stop reason: HOSPADM

## 2022-08-09 RX ORDER — DIPHENHYDRAMINE HYDROCHLORIDE 50 MG/ML
25 INJECTION INTRAMUSCULAR; INTRAVENOUS EVERY 6 HOURS PRN
Status: DISCONTINUED | OUTPATIENT
Start: 2022-08-09 | End: 2022-08-11 | Stop reason: HOSPADM

## 2022-08-09 RX ORDER — ROPIVACAINE HYDROCHLORIDE 7.5 MG/ML
INJECTION, SOLUTION EPIDURAL; PERINEURAL PRN
Status: DISCONTINUED | OUTPATIENT
Start: 2022-08-09 | End: 2022-08-09

## 2022-08-09 RX ORDER — PROCHLORPERAZINE MALEATE 10 MG
10 TABLET ORAL EVERY 6 HOURS PRN
Status: DISCONTINUED | OUTPATIENT
Start: 2022-08-09 | End: 2022-08-11 | Stop reason: HOSPADM

## 2022-08-09 RX ORDER — ENOXAPARIN SODIUM 100 MG/ML
40 INJECTION SUBCUTANEOUS EVERY 24 HOURS
Status: DISCONTINUED | OUTPATIENT
Start: 2022-08-10 | End: 2022-08-11 | Stop reason: HOSPADM

## 2022-08-09 RX ORDER — SODIUM CHLORIDE, SODIUM LACTATE, POTASSIUM CHLORIDE, CALCIUM CHLORIDE 600; 310; 30; 20 MG/100ML; MG/100ML; MG/100ML; MG/100ML
INJECTION, SOLUTION INTRAVENOUS CONTINUOUS
Status: DISCONTINUED | OUTPATIENT
Start: 2022-08-09 | End: 2022-08-11 | Stop reason: HOSPADM

## 2022-08-09 RX ORDER — ONDANSETRON 4 MG/1
4 TABLET, ORALLY DISINTEGRATING ORAL EVERY 30 MIN PRN
Status: DISCONTINUED | OUTPATIENT
Start: 2022-08-09 | End: 2022-08-11 | Stop reason: HOSPADM

## 2022-08-09 RX ORDER — OXYTOCIN 10 [USP'U]/ML
10 INJECTION, SOLUTION INTRAMUSCULAR; INTRAVENOUS
Status: DISCONTINUED | OUTPATIENT
Start: 2022-08-09 | End: 2022-08-11 | Stop reason: HOSPADM

## 2022-08-09 RX ORDER — SODIUM CHLORIDE, SODIUM LACTATE, POTASSIUM CHLORIDE, CALCIUM CHLORIDE 600; 310; 30; 20 MG/100ML; MG/100ML; MG/100ML; MG/100ML
INJECTION, SOLUTION INTRAVENOUS CONTINUOUS PRN
Status: DISCONTINUED | OUTPATIENT
Start: 2022-08-09 | End: 2022-08-09 | Stop reason: HOSPADM

## 2022-08-09 RX ORDER — KETOROLAC TROMETHAMINE 30 MG/ML
30 INJECTION, SOLUTION INTRAMUSCULAR; INTRAVENOUS EVERY 6 HOURS
Status: COMPLETED | OUTPATIENT
Start: 2022-08-09 | End: 2022-08-10

## 2022-08-09 RX ORDER — ROPIVACAINE HYDROCHLORIDE 7.5 MG/ML
INJECTION, SOLUTION EPIDURAL; PERINEURAL
Status: COMPLETED
Start: 2022-08-09 | End: 2022-08-09

## 2022-08-09 RX ORDER — DEXAMETHASONE SODIUM PHOSPHATE 10 MG/ML
INJECTION, SOLUTION INTRAMUSCULAR; INTRAVENOUS PRN
Status: DISCONTINUED | OUTPATIENT
Start: 2022-08-09 | End: 2022-08-09

## 2022-08-09 RX ORDER — FENTANYL CITRATE 50 UG/ML
25 INJECTION, SOLUTION INTRAMUSCULAR; INTRAVENOUS
Status: DISCONTINUED | OUTPATIENT
Start: 2022-08-09 | End: 2022-08-11 | Stop reason: HOSPADM

## 2022-08-09 RX ORDER — VITAMIN B COMPLEX
150 TABLET ORAL DAILY
Status: DISCONTINUED | OUTPATIENT
Start: 2022-08-10 | End: 2022-08-11 | Stop reason: HOSPADM

## 2022-08-09 RX ORDER — HALOPERIDOL 5 MG/ML
0.5 INJECTION INTRAMUSCULAR
Status: DISCONTINUED | OUTPATIENT
Start: 2022-08-09 | End: 2022-08-11 | Stop reason: HOSPADM

## 2022-08-09 RX ORDER — PROCHLORPERAZINE MALEATE 10 MG
10 TABLET ORAL EVERY 6 HOURS PRN
Status: DISCONTINUED | OUTPATIENT
Start: 2022-08-09 | End: 2022-08-09 | Stop reason: HOSPADM

## 2022-08-09 RX ORDER — ONDANSETRON 4 MG/1
4 TABLET, ORALLY DISINTEGRATING ORAL EVERY 6 HOURS PRN
Status: DISCONTINUED | OUTPATIENT
Start: 2022-08-09 | End: 2022-08-11 | Stop reason: HOSPADM

## 2022-08-09 RX ORDER — LIDOCAINE 40 MG/G
CREAM TOPICAL
Status: DISCONTINUED | OUTPATIENT
Start: 2022-08-09 | End: 2022-08-11 | Stop reason: HOSPADM

## 2022-08-09 RX ORDER — ONDANSETRON 2 MG/ML
4 INJECTION INTRAMUSCULAR; INTRAVENOUS EVERY 6 HOURS PRN
Status: DISCONTINUED | OUTPATIENT
Start: 2022-08-09 | End: 2022-08-09 | Stop reason: HOSPADM

## 2022-08-09 RX ORDER — ONDANSETRON 2 MG/ML
4 INJECTION INTRAMUSCULAR; INTRAVENOUS EVERY 30 MIN PRN
Status: DISCONTINUED | OUTPATIENT
Start: 2022-08-09 | End: 2022-08-11 | Stop reason: HOSPADM

## 2022-08-09 RX ORDER — ESCITALOPRAM OXALATE 5 MG/1
5 TABLET ORAL DAILY
Status: DISCONTINUED | OUTPATIENT
Start: 2022-08-09 | End: 2022-08-11 | Stop reason: HOSPADM

## 2022-08-09 RX ORDER — IBUPROFEN 800 MG/1
800 TABLET, FILM COATED ORAL
Status: DISCONTINUED | OUTPATIENT
Start: 2022-08-09 | End: 2022-08-09

## 2022-08-09 RX ORDER — ACETAMINOPHEN 325 MG/1
975 TABLET ORAL EVERY 6 HOURS
Status: DISCONTINUED | OUTPATIENT
Start: 2022-08-09 | End: 2022-08-11 | Stop reason: HOSPADM

## 2022-08-09 RX ORDER — METHYLERGONOVINE MALEATE 0.2 MG/ML
200 INJECTION INTRAVENOUS
Status: DISCONTINUED | OUTPATIENT
Start: 2022-08-09 | End: 2022-08-11 | Stop reason: HOSPADM

## 2022-08-09 RX ADMIN — ROPIVACAINE HYDROCHLORIDE 3 ML: 7.5 INJECTION, SOLUTION EPIDURAL; PERINEURAL at 13:43

## 2022-08-09 RX ADMIN — OXYTOCIN-SODIUM CHLORIDE 0.9% IV SOLN 30 UNIT/500ML 300 ML/HR: 30-0.9/5 SOLUTION at 15:15

## 2022-08-09 RX ADMIN — BUPIVACAINE HYDROCHLORIDE 30 ML: 2.5 INJECTION, SOLUTION EPIDURAL; INFILTRATION; INTRACAUDAL at 15:32

## 2022-08-09 RX ADMIN — SODIUM CHLORIDE, POTASSIUM CHLORIDE, SODIUM LACTATE AND CALCIUM CHLORIDE 1000 ML: 600; 310; 30; 20 INJECTION, SOLUTION INTRAVENOUS at 13:06

## 2022-08-09 RX ADMIN — MIDAZOLAM 2 MG: 1 INJECTION INTRAMUSCULAR; INTRAVENOUS at 14:42

## 2022-08-09 RX ADMIN — FENTANYL CITRATE 100 MCG: 50 INJECTION INTRAVENOUS at 14:42

## 2022-08-09 RX ADMIN — Medication 3 ML: at 13:41

## 2022-08-09 RX ADMIN — PHENYLEPHRINE HYDROCHLORIDE 200 MCG: 10 INJECTION INTRAVENOUS at 14:59

## 2022-08-09 RX ADMIN — SODIUM CHLORIDE, POTASSIUM CHLORIDE, SODIUM LACTATE AND CALCIUM CHLORIDE: 600; 310; 30; 20 INJECTION, SOLUTION INTRAVENOUS at 09:25

## 2022-08-09 RX ADMIN — Medication 6 ML/HR: at 13:50

## 2022-08-09 RX ADMIN — AZITHROMYCIN MONOHYDRATE 500 MG: 500 INJECTION, POWDER, LYOPHILIZED, FOR SOLUTION INTRAVENOUS at 14:36

## 2022-08-09 RX ADMIN — ESCITALOPRAM OXALATE 5 MG: 5 TABLET, FILM COATED ORAL at 17:23

## 2022-08-09 RX ADMIN — PHENYLEPHRINE HYDROCHLORIDE 100 MCG: 10 INJECTION INTRAVENOUS at 14:50

## 2022-08-09 RX ADMIN — KETOROLAC TROMETHAMINE 30 MG: 30 INJECTION, SOLUTION INTRAMUSCULAR; INTRAVENOUS at 23:38

## 2022-08-09 RX ADMIN — TRANEXAMIC ACID 1 G: 1 INJECTION, SOLUTION INTRAVENOUS at 14:41

## 2022-08-09 RX ADMIN — PHENYLEPHRINE HYDROCHLORIDE 200 MCG: 10 INJECTION INTRAVENOUS at 15:06

## 2022-08-09 RX ADMIN — OXYTOCIN-SODIUM CHLORIDE 0.9% IV SOLN 30 UNIT/500ML 600 ML/HR: 30-0.9/5 SOLUTION at 14:42

## 2022-08-09 RX ADMIN — METHYLERGONOVINE MALEATE 200 MCG: 0.2 INJECTION INTRAVENOUS at 14:43

## 2022-08-09 RX ADMIN — FENTANYL CITRATE 100 MCG: 50 INJECTION, SOLUTION INTRAMUSCULAR; INTRAVENOUS at 13:43

## 2022-08-09 RX ADMIN — Medication 100 MG: at 14:39

## 2022-08-09 RX ADMIN — Medication 2 MILLI-UNITS/MIN: at 09:25

## 2022-08-09 RX ADMIN — CEFAZOLIN 2 G: 1 INJECTION, POWDER, FOR SOLUTION INTRAMUSCULAR; INTRAVENOUS at 14:36

## 2022-08-09 RX ADMIN — KETOROLAC TROMETHAMINE 30 MG: 30 INJECTION, SOLUTION INTRAMUSCULAR; INTRAVENOUS at 17:40

## 2022-08-09 RX ADMIN — ONDANSETRON 4 MG: 2 INJECTION INTRAMUSCULAR; INTRAVENOUS at 14:55

## 2022-08-09 RX ADMIN — OXYCODONE HYDROCHLORIDE 5 MG: 5 TABLET ORAL at 22:56

## 2022-08-09 RX ADMIN — FENTANYL CITRATE 50 MCG: 50 INJECTION, SOLUTION INTRAMUSCULAR; INTRAVENOUS at 16:11

## 2022-08-09 RX ADMIN — PHENYLEPHRINE HYDROCHLORIDE 200 MCG: 10 INJECTION INTRAVENOUS at 14:55

## 2022-08-09 RX ADMIN — BUPIVACAINE 20 ML: 13.3 INJECTION, SUSPENSION, LIPOSOMAL INFILTRATION at 15:32

## 2022-08-09 RX ADMIN — ACETAMINOPHEN 325MG 975 MG: 325 TABLET ORAL at 17:23

## 2022-08-09 RX ADMIN — SODIUM CHLORIDE, POTASSIUM CHLORIDE, SODIUM LACTATE AND CALCIUM CHLORIDE: 600; 310; 30; 20 INJECTION, SOLUTION INTRAVENOUS at 15:12

## 2022-08-09 RX ADMIN — PHENYLEPHRINE HYDROCHLORIDE 100 MCG: 10 INJECTION INTRAVENOUS at 14:49

## 2022-08-09 RX ADMIN — DEXAMETHASONE SODIUM PHOSPHATE 10 MG: 10 INJECTION, SOLUTION INTRAMUSCULAR; INTRAVENOUS at 14:55

## 2022-08-09 RX ADMIN — CEFOXITIN SODIUM 2 G: 2 INJECTION, SOLUTION INTRAVENOUS at 20:37

## 2022-08-09 RX ADMIN — ACETAMINOPHEN 325MG 975 MG: 325 TABLET ORAL at 23:37

## 2022-08-09 RX ADMIN — PROPOFOL 200 MG: 10 INJECTION, EMULSION INTRAVENOUS at 14:39

## 2022-08-09 RX ADMIN — FENTANYL CITRATE 50 MCG: 50 INJECTION INTRAVENOUS at 14:58

## 2022-08-09 ASSESSMENT — ACTIVITIES OF DAILY LIVING (ADL)
ADLS_ACUITY_SCORE: 18
ADLS_ACUITY_SCORE: 22
ADLS_ACUITY_SCORE: 18
ADLS_ACUITY_SCORE: 18
ADLS_ACUITY_SCORE: 22
ADLS_ACUITY_SCORE: 18

## 2022-08-09 NOTE — ANESTHESIA POSTPROCEDURE EVALUATION
Patient: Alfreda Cortez    Procedure: Procedure(s):   SECTION       Anesthesia Type:  No value filed.    Note:  Disposition: Inpatient   Postop Pain Control: Uneventful            Sign Out: Well controlled pain   PONV: No   Neuro/Psych: Uneventful            Sign Out: Acceptable/Baseline neuro status   Airway/Respiratory: Uneventful            Sign Out: Acceptable/Baseline resp. status   CV/Hemodynamics:    Other NRE: NONE   DID A NON-ROUTINE EVENT OCCUR? No           Last vitals:  Vitals Value Taken Time   /67 22 1630   Temp 97.5  F (36.4  C) 22 1630   Pulse 67 22 1635   Resp 30 22 1635   SpO2 97 % 22 1634       Electronically Signed By: ADAM Oropeza CRNA  2022  4:56 PM

## 2022-08-09 NOTE — PLAN OF CARE
Pt reporting pain 8/10, cervical exam done pt 3-4 cm, 50%, Dr. Aponte updated and okay with epidural at this time. CRNA notified. Consent for epidural obtained. Epidural placed at 1340. Patient reported relief with epidural. 1405 pt reported feeling her water break, large amount of thick blood with clots noted on bhaskar pad. Dr. Aponte called at 1406 requested to come to room. Pt checked at 1410 per MD 6cm no bag of water noted on exam. Dr. Aponte left the room, and was called again at 1417 as patient passed a large amount of blood with clots present. Category one tracing, vitals stable. Pitocin decreased. Dr. Aponte in room at 1427 SVE per MD no change noted. Moderate amount of blood with clots on chucks pad after exam. Code C-Section called at 1429. Patient arrived in the OR at 1435 via bed.   Chux pads from intrapartum period weighed and was 1448.9ml.

## 2022-08-09 NOTE — PROGRESS NOTES
Patient transferred to OB via cart. Nadiya score 10/10. Pain level 4/10. Urine output 400. Hand off report given to Alberto RN's.
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
## 2022-08-09 NOTE — ANESTHESIA CARE TRANSFER NOTE
Patient: Alfreda Cortez    Procedure: * No procedures listed *       Diagnosis: * No pre-op diagnosis entered *  Diagnosis Additional Information: No value filed.    Anesthesia Type:   No value filed.     Note:      Level of Consciousness: awake              Patient transferred to: Labor and Delivery    Handoff Report: Identifed the Patient, Identified the Reponsible Provider, Reviewed the pertinent medical history, Discussed the surgical course, Reviewed Intra-OP anesthesia mangement and issues during anesthesia, Set expectations for post-procedure period and Allowed opportunity for questions and acknowledgement of understanding      Vitals:  Vitals Value Taken Time   /56 08/09/22 1357   Temp     Pulse     Resp     SpO2 100 % 08/09/22 1354   Vitals shown include unvalidated device data.    Electronically Signed By: ADAM Oropeza CRNA  August 9, 2022  1:59 PM

## 2022-08-09 NOTE — CARE PLAN
Sepsis BPA alert noted.  Pt's VSS, fundus firm at the U, light lochia noted.  Dr Aponte paged and updated.  No new orders obtained.  Orders already in for labs to be redrawn at 1900.  Plan to follow up with Dr Webster with those results as Dr Webster will be on call.  Will continue to monitor pt and provide cares as needed.

## 2022-08-09 NOTE — OP NOTE
Section Operative Note  [facility]    Pre-operative diagnosis: Intrauterine pregnancy at 40 weeks gestation  Advanced Maternal Age  Placental abruption     Post-operative diagnosis: Same   Procedure: Emergency Primary low transverse  section   Surgeon: Gagandeep Aponte MD   Assistant(s): Tracie Moraes NP  (assistance required for retraction, exposure, instrument handling, and wound closure)     Anesthesia: General Endotracheal Anesthesia and Tap block   Estimated blood loss: 1000ml (QBL pending)   Total IV fluids: (See anesthesia record)   Blood transfusion: No transfusion was given during surgery   Total urine output: (See anesthesia record)   Drains: Ngo catheter   Specimens: Placenta    Findings: Vigorous  female.  Apgar's 6/8, intact placenta with 3VC, nml pelvic anatomy/  Amniotomy yeilded blood stained fluid. Placental separation/clot noted upon uterine entry.    Complications: None   Condition: Mother stable, transfered to post-anesthesia recovery     Procedure Details:  The risks, benefits, complications, treatment options, and expected outcomes were briefly discussed with the patient.  The patient concurred with the proposed plan, giving informed consent.  The patient was taken to Operating Room, identity confirmed and the procedure verified as  Delivery.     After uneventful anesthesia placement the patient was prepped and draped in the left lateral tilt  position and a ngo catheter placed.   A low-transverse skin incision was made with a scalpel and carried down through the subcutaneous tissue to the fascia which was extended transversely. The fascia was  from the underlying rectus tissue superiorly and inferiorly. The peritoneum was identified and entered without difficulty. The utero-vesical peritoneal reflection was incised transversely and the bladder flap was bluntly freed from the lower uterine segment. A low transverse uterine incision was  made.  The infant was delivered from the Vtx  presentation.  After the umbilical cord was clamped and cut the infant was suctioned and handed to the awaiting resuscitation team.  Cord blood was obtained for evaluation. The placenta was removed intact and the uterus swept free of membranes and debris. The uterine incision was closed with running locked sutures of 1.0 Chromic in a 2 layer fashion.  Hemostasis was excellent. Irrigation with warm normal saline was carried out until clear.  FloSeal was placed for additional hemostasis along hysterotomy line.  The rectus muscles were re approximated with running 0 Vicryl.   The fascia was then reapproximated with running sutures of 0 PDS. The subcutaneous space was irrigated and checked for hemostasis.  The skin was reapproximated with running 3.0 Monocryl.  Steri strips and dressings were applied.   A vaginal exam was performed at the end of the procedure to evacuate the lower uterine segment and vagina of clots.  There were no complications and the patient was transferred to the recovery room in excellent and stable condition.    Instrument, sponge, and needle counts were correct prior the abdominal closure and at the conclusion of the case.         Gagandeep Aponte MD

## 2022-08-09 NOTE — ANESTHESIA PROCEDURE NOTES
TAP Procedure Note    Pre-Procedure   Staff -        CRNA: Kong Martin APRN CRNA       Performed By: CRNA       Location: OR       Procedure Start/Stop Times: 8/9/2022 3:32 PM and 8/9/2022 3:35 PM       Pre-Anesthestic Checklist: patient identified, IV checked, site marked, risks and benefits discussed, monitors and equipment checked, pre-op evaluation, at physician/surgeon's request and post-op pain management  Timeout:       Correct Patient: Yes        Correct Procedure: Yes        Correct Site: Yes        Correct Position: Yes        Correct Laterality: Yes        Site Marked: Yes  Procedure Documentation  Procedure: TAP       Diagnosis: POST OP PAIN CONTROL       Laterality: bilateral       Patient Position: supine       Skin prep: Chloraprep       Needle Type: insulated and short bevel       Needle Gauge: 20.        Needle Length (Inches): 4        Ultrasound guided       1. Ultrasound was used to identify targeted nerve, plexus, vascular marker, or fascial plane and place a needle adjacent to it in real-time.       2. Ultrasound was used to visualize the spread of anesthetic in close proximity to the above referenced structure.       3. A permanent image is entered into the patient's record.       4. The visualized anatomic structures appeared normal.       5. There were no apparent abnormal pathologic findings.    Assessment/Narrative         The placement was negative for: blood aspirated, painful injection and site bleeding       Paresthesias: No.       Bolus given via needle..        Secured via.        Insertion/Infusion Method: Single Shot       Complications: none       Injection made incrementally with aspirations every 5 mL.    Medication(s) Administered   Bupivacaine 0.25% PF (Infiltration) - Infiltration   30 mL - 8/9/2022 3:32:00 PM  Bupivacaine liposome (Exparel) 1.3% LA inj susp (Infiltration) - Infiltration   20 mL - 8/9/2022 3:32:00 PM  Medication Administration Time: 8/9/2022 3:32  PM     Comments:  Risks for regional anesthesia include but not limited to: infection, seizures, continued weakness or numbness, pain at injection site, injury to blood vessels    IMPLIED CONSENT/VERBAL CONSENT OBTAINED,  crash , discussed block with pt as entering room,
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
## 2022-08-09 NOTE — ANESTHESIA CARE TRANSFER NOTE
Patient: Alfreda Cortez    Procedure: Procedure(s):   SECTION       Diagnosis: * No pre-op diagnosis entered *  Diagnosis Additional Information: No value filed.    Anesthesia Type:   No value filed.     Note:      Level of Consciousness: awake and drowsy  Oxygen Supplementation: nasal cannula    Independent Airway: airway patency satisfactory and stable  Dentition: dentition unchanged  Vital Signs Stable: post-procedure vital signs reviewed and stable  Report to RN Given: handoff report given  Patient transferred to: PACU    Handoff Report: Identifed the Patient, Identified the Reponsible Provider, Reviewed the pertinent medical history, Discussed the surgical course, Reviewed Intra-OP anesthesia mangement and issues during anesthesia, Set expectations for post-procedure period and Allowed opportunity for questions and acknowledgement of understanding      Vitals:  Vitals Value Taken Time   /78 22 1550   Temp     Pulse 82 22 1551   Resp 14 22 1551   SpO2     Vitals shown include unvalidated device data.    Electronically Signed By: ADAM Oropeza CRNA  2022  3:52 PM

## 2022-08-09 NOTE — OR NURSING
Patient awake and alert, received fentanyl iv for pain with adequate pain control. Fundus firm, midline, no bleeding noted. Report given to Marge Peñaloza.

## 2022-08-09 NOTE — ANESTHESIA PROCEDURE NOTES
Epidural catheter Procedure Note    Pre-Procedure   Staff -        CRNA: Kong Martin APRN CRNA       Performed By: CRNA       Location: OB       Procedure Start/Stop Times: 8/9/2022 1:40 PM and 8/9/2022 1:55 PM       Pre-Anesthestic Checklist: patient identified, IV checked, risks and benefits discussed, informed consent, monitors and equipment checked, pre-op evaluation and at physician/surgeon's request  Timeout:       Correct Patient: Yes        Correct Procedure: Yes        Correct Site: Yes        Correct Position: Yes   Procedure Documentation  Procedure: epidural catheter       Patient Position: sitting       Skin prep: Betadine       Local skin infiltrated with 3 mL of 1% lidocaine.        Insertion Site: L3-4. (midline approach).       Technique: LORT saline        Needle Type: Coupangy needle       Needle Gauge: 18.        Needle Length (Inches): 3.5        Catheter: 20 G.          Catheter threaded easily.         6 cm epidural space.         Threaded 11 cm at skin.         # of attempts: 1 and  # of redirects:  0    Assessment/Narrative         Paresthesias: No.       Test dose of 3 mL lidocaine 1.5% w/ 1:200,000 epinephrine at.         Test dose negative, 3 minutes after injection, for signs of intravascular, subdural, or intrathecal injection.       Insertion/Infusion Method: LORT saline       Aspiration negative for Heme or CSF via Epidural Catheter.    Medication(s) Administered   Medication Administration Time: 8/9/2022 1:40 PM     Comments:  Lot [id number]  Exp 9-

## 2022-08-09 NOTE — PLAN OF CARE
Care assumed from PACU, vital signs stable, fundus midline, firm without massage at umbilicus. Patient reports pain well controlled see MAR for interventions.
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
## 2022-08-09 NOTE — ANESTHESIA PROCEDURE NOTES
Airway       Patient location during procedure: OR       Procedure Start/Stop Times: 8/9/2022 2:39 PM and 8/9/2022 2:39 PM  Staff -        CRNA: Kong Martin APRN CRNA       Performed By: CRNA  Consent for Airway        Urgency: elective  Indications and Patient Condition       Indications for airway management: bhaskar-procedural       Induction type:intravenous       Mask difficulty assessment: 1 - vent by mask    Final Airway Details       Final airway type: endotracheal airway       Successful airway: ETT - single and Oral  Endotracheal Airway Details        ETT size (mm): 6.5       Cuffed: yes       Successful intubation technique: direct laryngoscopy       DL Blade Type: Maldonado 2       Position: Right       Measured from: lips       Secured at (cm): 22       Bite block used: None    Post intubation assessment        Placement verified by: capnometry, equal breath sounds and chest rise        Number of attempts at approach: 1       Number of other approaches attempted: 0       Secured with: plastic tape       Ease of procedure: easy       Dentition: Intact and Unchanged    Medication(s) Administered   Medication Administration Time: 8/9/2022 2:39 PM

## 2022-08-09 NOTE — PROGRESS NOTES
(S)  Delayed entry.  Intrapartum assessment.  Called to see patient regarding vaginal bleeding.  Possible SROM with large amount of blood/clot passed PV  Comfortable with epidural.    (O)  AVSS  Abd soft  Cvx 5-6/100/-1.  Moderate blood/stained amniotic fluid.  Ext neg  FHT reactive, Cat 1  TocO:  Contractions Q 2-3 min, adequate labor on Pitocin.        (A/P)  Vaginal hemorrhage, suspected placental abruption.  Pt briefly observed as mom and baby both stable but bleeding continued without any cervical progression.  Thus recommended emergency  delivery for suspected placental abruption/henmorrhage.  Briefly eviewed goals, risks, alternatives for planned procedure.  Including risk of bleeding, infection, damage to nerves, blood vessels, bowel and bladder. Discussed recovery period and expected discomfort.. Defer tubal given uncertainty regarding prognosis and emergency nature of the situation.  Questions were answered and pt agreed to proceed and with POC.

## 2022-08-10 LAB
HGB BLD-MCNC: 8.4 G/DL (ref 11.7–15.7)
HOLD SPECIMEN: NORMAL
T PALLIDUM AB SER QL: NONREACTIVE

## 2022-08-10 PROCEDURE — 258N000003 HC RX IP 258 OP 636: Performed by: NURSE ANESTHETIST, CERTIFIED REGISTERED

## 2022-08-10 PROCEDURE — 258N000003 HC RX IP 258 OP 636: Performed by: OBSTETRICS & GYNECOLOGY

## 2022-08-10 PROCEDURE — 36415 COLL VENOUS BLD VENIPUNCTURE: CPT | Performed by: OBSTETRICS & GYNECOLOGY

## 2022-08-10 PROCEDURE — 250N000013 HC RX MED GY IP 250 OP 250 PS 637: Performed by: OBSTETRICS & GYNECOLOGY

## 2022-08-10 PROCEDURE — 250N000013 HC RX MED GY IP 250 OP 250 PS 637: Performed by: NURSE ANESTHETIST, CERTIFIED REGISTERED

## 2022-08-10 PROCEDURE — 85018 HEMOGLOBIN: CPT | Performed by: OBSTETRICS & GYNECOLOGY

## 2022-08-10 PROCEDURE — 120N000001 HC R&B MED SURG/OB

## 2022-08-10 PROCEDURE — 250N000011 HC RX IP 250 OP 636: Performed by: OBSTETRICS & GYNECOLOGY

## 2022-08-10 RX ORDER — NALOXONE HYDROCHLORIDE 0.4 MG/ML
0.4 INJECTION, SOLUTION INTRAMUSCULAR; INTRAVENOUS; SUBCUTANEOUS
Status: DISCONTINUED | OUTPATIENT
Start: 2022-08-10 | End: 2022-08-11 | Stop reason: HOSPADM

## 2022-08-10 RX ORDER — IBUPROFEN 800 MG/1
800 TABLET, FILM COATED ORAL EVERY 6 HOURS
Status: DISCONTINUED | OUTPATIENT
Start: 2022-08-10 | End: 2022-08-11 | Stop reason: HOSPADM

## 2022-08-10 RX ORDER — OXYCODONE HYDROCHLORIDE 5 MG/1
5 TABLET ORAL ONCE
Status: COMPLETED | OUTPATIENT
Start: 2022-08-10 | End: 2022-08-10

## 2022-08-10 RX ORDER — NALOXONE HYDROCHLORIDE 0.4 MG/ML
0.2 INJECTION, SOLUTION INTRAMUSCULAR; INTRAVENOUS; SUBCUTANEOUS
Status: DISCONTINUED | OUTPATIENT
Start: 2022-08-10 | End: 2022-08-11 | Stop reason: HOSPADM

## 2022-08-10 RX ORDER — CEPHALEXIN 500 MG/1
500 CAPSULE ORAL EVERY 8 HOURS SCHEDULED
Status: DISCONTINUED | OUTPATIENT
Start: 2022-08-10 | End: 2022-08-11 | Stop reason: HOSPADM

## 2022-08-10 RX ORDER — OXYCODONE HYDROCHLORIDE 5 MG/1
10 TABLET ORAL EVERY 4 HOURS PRN
Status: DISCONTINUED | OUTPATIENT
Start: 2022-08-10 | End: 2022-08-11 | Stop reason: HOSPADM

## 2022-08-10 RX ADMIN — OXYCODONE HYDROCHLORIDE 10 MG: 5 TABLET ORAL at 13:12

## 2022-08-10 RX ADMIN — ACETAMINOPHEN 325MG 975 MG: 325 TABLET ORAL at 05:05

## 2022-08-10 RX ADMIN — Medication 150 MCG: at 09:01

## 2022-08-10 RX ADMIN — SENNOSIDES AND DOCUSATE SODIUM 1 TABLET: 50; 8.6 TABLET ORAL at 08:56

## 2022-08-10 RX ADMIN — IBUPROFEN 800 MG: 800 TABLET, FILM COATED ORAL at 11:13

## 2022-08-10 RX ADMIN — ESCITALOPRAM OXALATE 5 MG: 5 TABLET, FILM COATED ORAL at 08:57

## 2022-08-10 RX ADMIN — CEFOXITIN SODIUM 2 G: 2 INJECTION, SOLUTION INTRAVENOUS at 07:43

## 2022-08-10 RX ADMIN — OXYCODONE HYDROCHLORIDE 5 MG: 5 TABLET ORAL at 08:56

## 2022-08-10 RX ADMIN — OXYCODONE HYDROCHLORIDE 5 MG: 5 TABLET ORAL at 02:43

## 2022-08-10 RX ADMIN — CEPHALEXIN 500 MG: 500 CAPSULE ORAL at 21:48

## 2022-08-10 RX ADMIN — ENOXAPARIN SODIUM 40 MG: 40 INJECTION SUBCUTANEOUS at 07:46

## 2022-08-10 RX ADMIN — OXYCODONE HYDROCHLORIDE 10 MG: 5 TABLET ORAL at 17:38

## 2022-08-10 RX ADMIN — OXYCODONE HYDROCHLORIDE 10 MG: 5 TABLET ORAL at 21:48

## 2022-08-10 RX ADMIN — CEPHALEXIN 500 MG: 500 CAPSULE ORAL at 13:46

## 2022-08-10 RX ADMIN — PRENATAL VITAMINS-IRON FUMARATE 27 MG IRON-FOLIC ACID 0.8 MG TABLET 1 TABLET: at 08:56

## 2022-08-10 RX ADMIN — SENNOSIDES AND DOCUSATE SODIUM 1 TABLET: 50; 8.6 TABLET ORAL at 21:48

## 2022-08-10 RX ADMIN — IBUPROFEN 800 MG: 800 TABLET, FILM COATED ORAL at 17:06

## 2022-08-10 RX ADMIN — CEFOXITIN SODIUM 2 G: 2 INJECTION, SOLUTION INTRAVENOUS at 02:06

## 2022-08-10 RX ADMIN — SODIUM CHLORIDE, POTASSIUM CHLORIDE, SODIUM LACTATE AND CALCIUM CHLORIDE: 600; 310; 30; 20 INJECTION, SOLUTION INTRAVENOUS at 02:09

## 2022-08-10 RX ADMIN — ACETAMINOPHEN 325MG 975 MG: 325 TABLET ORAL at 11:13

## 2022-08-10 RX ADMIN — IRON SUCROSE 200 MG: 20 INJECTION, SOLUTION INTRAVENOUS at 09:10

## 2022-08-10 RX ADMIN — ACETAMINOPHEN 325MG 975 MG: 325 TABLET ORAL at 17:05

## 2022-08-10 RX ADMIN — KETOROLAC TROMETHAMINE 30 MG: 30 INJECTION, SOLUTION INTRAMUSCULAR; INTRAVENOUS at 05:13

## 2022-08-10 RX ADMIN — OXYCODONE HYDROCHLORIDE 5 MG: 5 TABLET ORAL at 07:41

## 2022-08-10 ASSESSMENT — ACTIVITIES OF DAILY LIVING (ADL)
ADLS_ACUITY_SCORE: 18
ADLS_ACUITY_SCORE: 18
ADLS_ACUITY_SCORE: 22
ADLS_ACUITY_SCORE: 18
ADLS_ACUITY_SCORE: 22
ADLS_ACUITY_SCORE: 18
ADLS_ACUITY_SCORE: 22
ADLS_ACUITY_SCORE: 18
ADLS_ACUITY_SCORE: 22
ADLS_ACUITY_SCORE: 22

## 2022-08-10 NOTE — PROGRESS NOTES
[facility] Obstetrics Post-Op / Progress Note         Assessment and Plan:    Assessment:   Post-operative day #1  Emergency low transverse primary  section      Acute blood loss anemia. Mildly symptomatic.   Doing well, stable      Plan:   Routine post-operative care.  Ambulation (with assistance) encouraged  Iron sucrose IV iron infusion x 2  Repeat CBC in AM  Amairani procedure prophylactic antibiotics  DVT prophylaxis (SCD's/Lovenox)             Interval History:     SUBJECTIVE: No complaints    Pain controlled. 8/10  Tolerating a regular diet? YES  Ambulating? YES with some dizziness  Voiding without difficulty? No: Smith in place overnight good UO  Lochia? minimal  Breastfeeding:  yes          Significant Problems:     Patient Active Problem List   Diagnosis     JESSI (generalized anxiety disorder)     Moderate episode of recurrent major depressive disorder (H)     Morning sickness     Supervision of other high risk pregnancies, second trimester     Hypovitaminosis D     Low serum progesterone     Multigravida of advanced maternal age in second trimester     Normal labor and delivery      delivery delivered             Review of Systems:   The patient denies any chest pain, shortness of breath, excessive pain, fever, chills, purulent drainage from the wound, nausea or vomiting.  Lochia is decreased.  Mild dizziness with standing.           Medications:   All medications related to the patient's surgery have been reviewed          Physical Exam:   Vitals reviewed and stable  Wound clean and dry with minimal or no drainage.  Surrounding skin with minimal erythema.  Extremities: negative          Data:   All laboratory data related to this surgery reviewed.  HG 8.4 this AM  All imaging studies related to this surgery reviewed    Gagandeep Aponte MD

## 2022-08-10 NOTE — CARE PLAN
Assessments as charted. B/P: 101/51, T: 98.6, P: 68, R: 16. Rates pain: 8/10 pt given prn pain meds and stated pain improved, but pain quickly increased again, Dr Aponte was informed by pt on rounds.  Order for an additional one time dose of oxycodone and dose was increased to 10mg for future doses.  Pt reported inprovement of pain with additional dose of pain med. Incision: Healing well, well approximated, without signs of infection and no drainage. Voiding without difficulty. Fundus firm at U-1. Lochia: Light. Activity: pt able to move in bed and ambulate without difficulty due to pain.  Pt was up to the bathroom with standby assist of 1 to void since ngo was removed.  Pt did well, but then reported feeling dizzy while on the toilet.  Pt was able to void 500 ml.  Pt pale with acitivty, no shortness of breath noted.  Pt back to bed and into supine position.   VSS after back in bed.  Pt is aware not to get up independently at this time.  Dr Aponte updated of pt status. Order obtained for iron sucrose.  Medication given per orders. Infant feeding: Breast feeding going well, per mom's report.  No latch noted yet this shift.  Baby is sleepy at this time.  Encouraged feeding on demand and waking baby every 3-4 hrs to feed.           Postpartum breastfeeding assessment completed and education provided, see Patient Education Activity.  Items included in the education are:     proper positioning and latch    effectiveness of feeding    manual expression    handling and storing breastmilk    maintenance of breastfeeding for the first 6 months    sign/symptoms of infant feeding issues requiring referral to qualified health care provider  Postpartum care education provided, see Patient Education activity. Patient denies needs. Will monitor.  Iman Mojica RN

## 2022-08-10 NOTE — PLAN OF CARE
Face to face report given with opportunity to observe patient.    Report given to Roxana Panchal RN   8/9/2022  7:23 PM

## 2022-08-10 NOTE — CARE PLAN
Pt rating incisional pain and intermittent cramping 8/10, scheduled tylenol and ibuprofen were given per orders.  Pt stated pain was down to a 7/10 40 minutes after medications were given.  Pt stated pain is tolerable at this time.  Pt is able to get prn oxycodone in 1 hr.  Pt is aware to call if pain increases and she needs something for pain.  Will continue to monitor pt and provide cares as needed.

## 2022-08-10 NOTE — CARE PLAN
Pt up to the bathroom this evening with standby assist, stated she felt better than the last time she was up.  No complaints of dizziness.  Fundus firm at U-1, scant lochia.  Vitals stable, with exception of BP running lower as it has been since pt's admission.  Pt reports adequate pain control with scheduled and prn pain meds.  Assistance provided with getting baby latched on deep as baby was just sucking on the nipple.  Pt encouraged to call for assistance with feedings as needed and aware to continue to call for assistance with activity.  Will continue to monitor and report to oncoming shift.

## 2022-08-10 NOTE — PLAN OF CARE
Assessments as charted. B/P: 101/50, T: 98.2, P: 68, R: 16. Rates pain: 7/10 Medications given. Incision: Healing well, well approximated, without signs of infection, and no drainage. Voiding without difficulty. Fundus Firm U/U. Lochia: Light. Activity: moving with difficulty due to post op surgical pain. Infant feeding: Breast feeding going well.           Postpartum breastfeeding assessment completed and education provided, see Patient Education Activity.  Items included in the education are:   proper positioning and latch  effectiveness of feeding  manual expression  handling and storing breastmilk  maintenance of breastfeeding for the first 6 months  sign/symptoms of infant feeding issues requiring referral to qualified health care provider  Postpartum care education provided, see Patient Education activity. Patient denies needs. Will monitor.  Roxana Velez RN

## 2022-08-10 NOTE — CARE PLAN
Pt up to the bathroom with standby assist of 1 and the gait belt.  Pt steady on her feet and able to void 500 ml without difficulty.  Pt denied any dizziness so ambulated to the warmer in the room and almost to the back counter and pt reported feeling dizzy.  Pt back to bed.  Vitals monitored prior to getting up and BP was 98/54 HR 69.  Once pt was back to bed pt placed in supine position BP 99/54, 80.  Cool cloth applied to forehead and pt reported feeling better.  Pt tolerated the activity better than earlier this shift, but still dizzy with activity.  Dr Aponte paged and updated of pt's blood pressure, which have been running a little low, but pt's baseline upon admit was low yesterday as well, also updated of dizziness with activity.  No new orders at this time.  Will continue to monitor pt and provide cares as needed.  Pt is aware of need to continue to call for assistance with activity.

## 2022-08-11 VITALS
WEIGHT: 207 LBS | DIASTOLIC BLOOD PRESSURE: 54 MMHG | HEIGHT: 65 IN | RESPIRATION RATE: 16 BRPM | HEART RATE: 66 BPM | SYSTOLIC BLOOD PRESSURE: 106 MMHG | OXYGEN SATURATION: 98 % | BODY MASS INDEX: 34.49 KG/M2 | TEMPERATURE: 98.4 F

## 2022-08-11 LAB
BASOPHILS # BLD AUTO: 0 10E3/UL (ref 0–0.2)
BASOPHILS NFR BLD AUTO: 0 %
EOSINOPHIL # BLD AUTO: 0.2 10E3/UL (ref 0–0.7)
EOSINOPHIL NFR BLD AUTO: 1 %
ERYTHROCYTE [DISTWIDTH] IN BLOOD BY AUTOMATED COUNT: 16.1 % (ref 10–15)
HCT VFR BLD AUTO: 23.4 % (ref 35–47)
HGB BLD-MCNC: 7.7 G/DL (ref 11.7–15.7)
HGB BLD-MCNC: 8.3 G/DL (ref 11.7–15.7)
HOLD SPECIMEN: NORMAL
IMM GRANULOCYTES # BLD: 0.4 10E3/UL
IMM GRANULOCYTES NFR BLD: 3 %
LYMPHOCYTES # BLD AUTO: 3.2 10E3/UL (ref 0.8–5.3)
LYMPHOCYTES NFR BLD AUTO: 24 %
MCH RBC QN AUTO: 29.4 PG (ref 26.5–33)
MCHC RBC AUTO-ENTMCNC: 32.9 G/DL (ref 31.5–36.5)
MCV RBC AUTO: 89 FL (ref 78–100)
MONOCYTES # BLD AUTO: 1 10E3/UL (ref 0–1.3)
MONOCYTES NFR BLD AUTO: 8 %
NEUTROPHILS # BLD AUTO: 8.7 10E3/UL (ref 1.6–8.3)
NEUTROPHILS NFR BLD AUTO: 64 %
NRBC # BLD AUTO: 0 10E3/UL
NRBC BLD AUTO-RTO: 0 /100
PLATELET # BLD AUTO: 228 10E3/UL (ref 150–450)
RBC # BLD AUTO: 2.62 10E6/UL (ref 3.8–5.2)
WBC # BLD AUTO: 13.4 10E3/UL (ref 4–11)

## 2022-08-11 PROCEDURE — 36415 COLL VENOUS BLD VENIPUNCTURE: CPT | Performed by: OBSTETRICS & GYNECOLOGY

## 2022-08-11 PROCEDURE — 250N000013 HC RX MED GY IP 250 OP 250 PS 637: Performed by: OBSTETRICS & GYNECOLOGY

## 2022-08-11 PROCEDURE — 258N000003 HC RX IP 258 OP 636: Performed by: OBSTETRICS & GYNECOLOGY

## 2022-08-11 PROCEDURE — 85025 COMPLETE CBC W/AUTO DIFF WBC: CPT | Performed by: OBSTETRICS & GYNECOLOGY

## 2022-08-11 PROCEDURE — 85018 HEMOGLOBIN: CPT | Performed by: OBSTETRICS & GYNECOLOGY

## 2022-08-11 PROCEDURE — 250N000011 HC RX IP 250 OP 636: Performed by: OBSTETRICS & GYNECOLOGY

## 2022-08-11 RX ORDER — IBUPROFEN 800 MG/1
800 TABLET, FILM COATED ORAL EVERY 8 HOURS PRN
Qty: 40 TABLET | Refills: 1 | Status: SHIPPED | OUTPATIENT
Start: 2022-08-11 | End: 2023-10-10

## 2022-08-11 RX ORDER — ESCITALOPRAM OXALATE 5 MG/1
5 TABLET ORAL DAILY
Qty: 90 TABLET | Refills: 1 | Status: SHIPPED | OUTPATIENT
Start: 2022-08-11 | End: 2023-10-10

## 2022-08-11 RX ORDER — CEPHALEXIN 500 MG/1
500 CAPSULE ORAL 3 TIMES DAILY
Qty: 21 CAPSULE | Refills: 0 | Status: SHIPPED | OUTPATIENT
Start: 2022-08-11 | End: 2022-08-18

## 2022-08-11 RX ORDER — OXYCODONE HYDROCHLORIDE 10 MG/1
5-10 TABLET ORAL EVERY 4 HOURS PRN
Qty: 35 TABLET | Refills: 0 | Status: SHIPPED | OUTPATIENT
Start: 2022-08-11 | End: 2023-10-10

## 2022-08-11 RX ORDER — AMOXICILLIN 250 MG
2 CAPSULE ORAL 2 TIMES DAILY PRN
Qty: 60 TABLET | Refills: 0 | Status: SHIPPED | OUTPATIENT
Start: 2022-08-11 | End: 2023-10-10

## 2022-08-11 RX ADMIN — CEPHALEXIN 500 MG: 500 CAPSULE ORAL at 13:37

## 2022-08-11 RX ADMIN — OXYCODONE HYDROCHLORIDE 10 MG: 5 TABLET ORAL at 04:56

## 2022-08-11 RX ADMIN — CEPHALEXIN 500 MG: 500 CAPSULE ORAL at 06:22

## 2022-08-11 RX ADMIN — OXYCODONE HYDROCHLORIDE 10 MG: 5 TABLET ORAL at 09:41

## 2022-08-11 RX ADMIN — SENNOSIDES AND DOCUSATE SODIUM 2 TABLET: 50; 8.6 TABLET ORAL at 08:24

## 2022-08-11 RX ADMIN — IBUPROFEN 800 MG: 800 TABLET, FILM COATED ORAL at 13:37

## 2022-08-11 RX ADMIN — IBUPROFEN 800 MG: 800 TABLET, FILM COATED ORAL at 07:47

## 2022-08-11 RX ADMIN — OXYCODONE HYDROCHLORIDE 10 MG: 5 TABLET ORAL at 13:38

## 2022-08-11 RX ADMIN — ACETAMINOPHEN 325MG 975 MG: 325 TABLET ORAL at 07:28

## 2022-08-11 RX ADMIN — ACETAMINOPHEN 325MG 975 MG: 325 TABLET ORAL at 00:56

## 2022-08-11 RX ADMIN — Medication 150 MCG: at 08:24

## 2022-08-11 RX ADMIN — ENOXAPARIN SODIUM 40 MG: 40 INJECTION SUBCUTANEOUS at 08:24

## 2022-08-11 RX ADMIN — PRENATAL VITAMINS-IRON FUMARATE 27 MG IRON-FOLIC ACID 0.8 MG TABLET 1 TABLET: at 08:24

## 2022-08-11 RX ADMIN — IBUPROFEN 800 MG: 800 TABLET, FILM COATED ORAL at 00:56

## 2022-08-11 RX ADMIN — ACETAMINOPHEN 325MG 975 MG: 325 TABLET ORAL at 13:35

## 2022-08-11 RX ADMIN — IRON SUCROSE 200 MG: 20 INJECTION, SOLUTION INTRAVENOUS at 09:44

## 2022-08-11 RX ADMIN — ESCITALOPRAM OXALATE 5 MG: 5 TABLET, FILM COATED ORAL at 09:25

## 2022-08-11 ASSESSMENT — ACTIVITIES OF DAILY LIVING (ADL)
ADLS_ACUITY_SCORE: 18

## 2022-08-11 NOTE — CARE PLAN
Assessments as charted. B/P: 106/54, T: 97.9, P: 66, R: 16. Rates pain: 8/10 but states pain decreases to 5/10 with prn pain meds and scheduled pain meds. Incision: Healing well, well approximated, without signs of infection and no drainage. Voiding without difficulty. Fundus firm at U-1. Lochia: Light. Activity: pt up to the bathroom with standby assist at the start of the shift, encouraged ambulation around the room to see if pt would tolerate activity without feeling dizzy.  Pt walked to the back of the room and back to bed.  Pt up in the chair feeding baby the last feed and ambulated a lap in the dejesus, denied any dizziness with activity.  . Infant feeding: Breast feeding going fair, at the beginning of the shift baby was eager to feed and would fall asleep at the breast.  The last feed baby fed better, stayed awake longer.  Encouraged mom to pump after feedings and supplement baby with expressed milk as baby's weight loss is at 8.7% and bili was elevated yesterday (bili recheck pending at this time).  Pump reviewed with mom and she pumped earlier, baby was cup fed expressed colostrum per RN and reviewed cup feeding with mom.  IV iron per orders this am.  Will continue to monitor pt and provide cares as needed.            Postpartum breastfeeding assessment completed and education provided, see Patient Education Activity.  Items included in the education are:     proper positioning and latch    effectiveness of feeding    manual expression    handling and storing breastmilk    maintenance of breastfeeding for the first 6 months    sign/symptoms of infant feeding issues requiring referral to qualified health care provider  Postpartum care education provided, see Patient Education activity. Patient denies needs. Will monitor.  Iman Mojica RN

## 2022-08-11 NOTE — CARE PLAN
Face to face report given with opportunity to observe patient.    Report given to Qi Mojica RN   8/10/2022  7:24 PM

## 2022-08-11 NOTE — PLAN OF CARE
Goal Outcome Evaluation:    Plan of Care Reviewed With: patient     Patient discharged at 3:20 PM via ambulation accompanied by significant other and staff. Prescriptions sent to patients preferred pharmacy. All belongings sent with patient.     Discharge instructions reviewed with pt. Listed belongings gathered and returned to patient. Clothing, cell phone, diaper bag.    Patient discharged to home.     Core Measures and Vaccines  Core Measures applicable during stay: no  Pneumonia and Influenza given prior to discharge, if indicated:  pt current on vaccines    Surgical Patient   Surgical Procedures during stay: yes  Did patient receive discharge instruction on wound care and recognition of infection symptoms?  Incision care reviewed    MISC  Follow up appointment made:  Yes  Home and hospital aquired medications returned to patient:  n/a  Patient reports pain was well managed at discharge: Yes

## 2022-08-11 NOTE — PROGRESS NOTES
Patient:   Alfreda  Reluctant to participate in conversation with writer.  Evasive with her answers.        Lives at: home  Lives with: significant other, Ronaldo and four children; 15, 13,8 and 2 years old- currently being watched by neighbor and significant other goes home in the evening   Support System: Ronaldo, neighbors, some family support     Primary PCP:  Saige Frankel  OB:  Aponte   Baby PCP: MATHEUS Braswell   Insurance: [facility]   Pregnancy Hx:   7th child, marijuana use during pregnancy, did miss some appointments       Agency Contacts: not currently connected but verbalizes she is familiar with area resources.  Numbers for [facility] provided in discharge instructions   Mental Health: anxiety, depression- connected with [facility] and feels comfortable reaching out to her if she needs to.    Violence: denies   Substance Abuse: tobacco use, denies additional substance use     Adequate resources for needs (housing, utilities, food/med): yes     Transportation:  YES  Car Seat: Yes  Breast Pump:  Yes  Formula: No  Diapers:  Yes  Crib or Bassinet: pack and play for baby     Education concerns on self/baby care:   Writer not aware of any     Community Resources: not currently connected, states she is familiar with the resources.  Information included in discharge instructions.    Updated Dora BUSTOS.      HENNY Mar @[phone] August 11, 2022

## 2022-08-11 NOTE — PLAN OF CARE
Assessments as charted. B/P: 107/55, T: 98.1, P: 66, R: 18. Rates pain: 6/10. Pt receiving scheduled tylenol, ibu and PRN oxy.  Incision: Healing well, well approximated, and without signs of infection. Voiding without difficulty. Fundus firm, U/1. Lochia: Light. Activity: normal activity. Infant feeding: Breast feeding going well. Pt ambulated to bathroom SBA. Denies dizziness or weakness, and appears steady on feet. Denies needs at this time.       Postpartum breastfeeding assessment completed and education provided, see Patient Education Activity.  Items included in the education are:   proper positioning and latch  effectiveness of feeding  manual expression  handling and storing breastmilk  maintenance of breastfeeding for the first 6 months  sign/symptoms of infant feeding issues requiring referral to qualified health care provider  Postpartum care education provided, see Patient Education activity. Patient denies needs. Will monitor.  Tatyana Kapadia RN

## 2022-08-11 NOTE — PLAN OF CARE
Face to face report given with opportunity to observe patient.    Report given to Dora Kapadia RN   8/11/2022  7:16 AM

## 2022-08-11 NOTE — PROGRESS NOTES
[facility] Obstetrics Post-Op / Progress Note         Assessment and Plan:    Assessment:   Post-operative day #2  Low transverse primary  section  Acute blood loss anemia             Plan:   Routine post-operative care.  Ambulation encouraged  Recheck hb at noon.   If stable pt desiring discharge later today.    Second dose IV iron today.  Home on PO iron.            Interval History:     SUBJECTIVE: No complaints.  Doing well, improved.  Denies dizziness but hasn't ambulated much other than to bathroom and back.     Pain controlled? Yes  Tolerating a regular diet? YES  Ambulating? YES  Voiding without difficulty? Yes  Lochia? minimal  Breastfeeding:  yes          Significant Problems:     Patient Active Problem List   Diagnosis     JESSI (generalized anxiety disorder)     Moderate episode of recurrent major depressive disorder (H)     Morning sickness     Supervision of other high risk pregnancies, second trimester     Hypovitaminosis D     Low serum progesterone     Multigravida of advanced maternal age in second trimester     Normal labor and delivery      delivery delivered             Review of Systems:   The patient denies any chest pain, shortness of breath, excessive pain, fever, chills, purulent drainage from the wound, nausea or vomiting.  Lochia is decreased          Medications:   All medications related to the patient's surgery have been reviewed          Physical Exam:   Vitals reviewed and stable  Wound clean and dry with minimal or no drainage.  Surrounding skin with minimal erythema.  Extremities: negative          Data:   All laboratory data related to this surgery reviewed  All imaging studies related to this surgery reviewed    Gaagndeep Aponte MD

## 2022-08-11 NOTE — CARE PLAN
EPDS score 7.  Pt answered hardly ever to having thoughts of harming herself.  Dr Aponte was paged and updated.  No new orders at this time.  Pt is currently on antidepressants and she denies having thoughts of hurting herself at this time.  Pt is aware to call 911 if she has any thoughts of hurting herself or baby.

## 2022-08-11 NOTE — DISCHARGE SUMMARY
"Discharge Summary    Alfreda Cortez MRN# [id number]   YOB: 1985 Age: 37 year old     Date of Admission:  2022  Date of Discharge:  2022  Admitting Physician:  Gagandeep Aponte MD  Discharge Physician:  Gagandeep Aponte MD  Discharging Service:  Obstetrics and Gynecology     Primary Provider: Saige Frankel          Admission Diagnoses:   Term pregnancy, advanced maternal age          Discharge Diagnosis:     Term IUP, Delivered  S/p  section  Placental abruption.  Acute blood loss anemia          Discharge Disposition:   Discharged to home           Condition on Discharge:   Discharge condition: Stable   Discharge vitals: Blood pressure 106/54, pulse 66, temperature 97.9  F (36.6  C), resp. rate 16, height 1.651 m (5' 5\"), weight 93.9 kg (207 lb), last menstrual period 2021, SpO2 98 %, unknown if currently breastfeeding.   Code status on discharge: Full Code           Procedures / Labs / Imaging:   Pitocin induction of labor with epidural anesthesia 22  Primary Emergency LTCS 2022   IV iron infusion 8/10 and 22.           Medications Prior to Admission:     Medications Prior to Admission   Medication Sig Dispense Refill Last Dose     Prenatal Vit-Fe Fumarate-FA (PRENATAL MULTIVITAMIN W/IRON) 27-0.8 MG tablet Take 1 tablet by mouth daily   2022 at 0730     vitamin D3 (CHOLECALCIFEROL) 50 mcg (2000 units) tablet Take 3 tablets (150 mcg) by mouth daily 90 tablet 1 2022 at 0730     [DISCONTINUED] escitalopram (LEXAPRO) 5 MG tablet Take 1 tablet (5 mg) by mouth daily 30 tablet 1 Past Week at 0900     [DISCONTINUED] ondansetron (ZOFRAN-ODT) 4 MG ODT tab Take 1 tablet (4 mg) by mouth every 8 hours as needed for nausea 20 tablet 1 More than a month at Unknown time     [DISCONTINUED] QUEtiapine (SEROQUEL XR) 50 MG TB24 24 hr tablet Take 1 Tablet By Oral Route 1 time per day in the evening without food or with a light meal   More than a month at Unknown time "             Discharge Medications:     Current Discharge Medication List      START taking these medications    Details   cephALEXin (KEFLEX) 500 MG capsule Take 1 capsule (500 mg) by mouth 3 times daily for 7 days  Qty: 21 capsule, Refills: 0    Associated Diagnoses:  delivery delivered      ibuprofen (ADVIL/MOTRIN) 800 MG tablet Take 1 tablet (800 mg) by mouth every 8 hours as needed for moderate pain  Qty: 40 tablet, Refills: 1    Associated Diagnoses:  delivery delivered      iron (FERROUS GLUCONATE) 256 (28 Fe) MG tablet Take 1 tablet (100 mg) by mouth daily  Qty: 90 tablet, Refills: 0    Associated Diagnoses:  delivery delivered      oxyCODONE (ROXICODONE) 10 MG tablet Take 0.5-1 tablets (5-10 mg) by mouth every 4 hours as needed for moderate to severe pain  Qty: 35 tablet, Refills: 0    Associated Diagnoses:  delivery delivered      senna-docusate (SENOKOT-S/PERICOLACE) 8.6-50 MG tablet Take 2 tablets by mouth 2 times daily as needed for constipation  Qty: 60 tablet, Refills: 0    Associated Diagnoses:  delivery delivered         CONTINUE these medications which have CHANGED    Details   escitalopram (LEXAPRO) 5 MG tablet Take 1 tablet (5 mg) by mouth daily  Qty: 90 tablet, Refills: 1    Associated Diagnoses: JESSI (generalized anxiety disorder); Moderate episode of recurrent major depressive disorder (H)         CONTINUE these medications which have NOT CHANGED    Details   Prenatal Vit-Fe Fumarate-FA (PRENATAL MULTIVITAMIN W/IRON) 27-0.8 MG tablet Take 1 tablet by mouth daily      vitamin D3 (CHOLECALCIFEROL) 50 mcg (2000 units) tablet Take 3 tablets (150 mcg) by mouth daily  Qty: 90 tablet, Refills: 1    Comments: Take 50,000 international unit(s) weekly  Associated Diagnoses: Supervision of other high risk pregnancies, first trimester         STOP taking these medications       ondansetron (ZOFRAN-ODT) 4 MG ODT tab Comments:   Reason for Stopping:         QUEtiapine  (SEROQUEL XR) 50 MG TB24 24 hr tablet Comments:   Reason for Stopping:                     Consultations:   No consultations were requested during this admission             Brief History of Illness:   Alfreda Cortez is a 37 year old female who was admitted for labor induction at term          Hospital Course:    Patient underwent Pitocin IOL on 8/9/22.  She requested/recieved epidural anesthesia.  Subsequently SROM occurred with vaginal hemorrhage and placental abruption.   Emergency  LTCS August / 09/2022 under general anesthesia was performed..  Smith catheter removed on pod#1 and diet advanced.  Patient was afebrile throughout hospitalization.  She was noted to be anemic on POD #1 with HG 8.4.  IV iron infusions were given on POD1 and 2.  Her postoperative course was otherwise uncomplicated and she was discharged on POD#2.         Significant Results:   Hg 8.3 on discharge.              Pending Results:   None           Discharge Instructions and Follow-Up:   Discharge diet: Regular   Discharge activity: Lifting restricted to 20 pounds for 6 weeks   Discharge follow-up: Follow up with me in 2 weeks   Wound care: Keep clean and dry   Other instructions: Discharge to home.  Discussed meds, restrictions, follow-up.   Call prn if problems in interim.

## 2022-08-11 NOTE — DISCHARGE INSTRUCTIONS
No heavy lifting greater than 20lb  for 6 weeks  No driving 2 weeks or while on pain meds  Pelvic rest for 4 weeks  No vigorous activity, exercise, swim, bath for 4 weeks  Schedule Postop check Dr. Aponte 2 weeks.  Lactation f/u 2-4 days prn.  PP exam Dr. Aponte  6 weeks.    Call Dr. Aponte [phone] as necessary if problems in interim     Postop  Birth Instructions    Activity: as stated above    Do not lift more than 10 pounds for 6 weeks after surgery.  Ask family and friends for help when you need it.  No driving until you have stopped taking your narcotic pain medications   No heavy exercise or activity for 6 weeks.  Don't do anything that will put a strain on your surgery site.  Don't strain when using the toilet.  Your care team may prescribe a stool softener if you have problems with your bowel movements.    To care for your incision:    Keep the incision clean and dry.  Do not soak your incision in water. No swimming or hot tubs until it has fully healed. You may soak in the bathtub if the water level is below your incision.  Do not use peroxide, gel, cream, lotion, or ointment on your incision.  Adjust your clothes to avoid pressure on your surgery site (check the elastic in your underwear for example).    You may see a small amount of clear or pink drainage and this is normal.  Check with your health care provider:    If the drainage increases or has an odor.  If the incision reddens, you have swelling, or develop a rash.  If you have increased pain and the medicine we prescribed doesn't help.  If you have a fever above 100.4 F (38 C) with or without chills when placing thermometer under your tongue.  The area around your incision (surgery wound), will feel numb.  This is normal. The numbness should go away in less than a year.     Keep your hands clean:  Always wash your hands before touching your incision (surgery wound). This helps reduce your risk of infection. If your hands aren't dirty, you  may use an alcohol hand-rub to clean your hands. Keep your nails clean and short.    Call your healthcare provider if you have any of these symptoms:    You soak a sanitary pad with blood within 1 hour, or you see blood clots larger than a golf ball.  Bleeding that lasts more than 6 weeks.  Vaginal discharge that smells bad.  Severe pain, cramping or tenderness in your lower belly area.  A need to urinate more frequently (use the toilet more often), more urgently (use the toilet very quickly), or it burns when you urinate.  Nausea and vomiting.  Redness, swelling or pain around a vein in your leg.  Problems breastfeeding or a red or painful area on your breast.  Chest pain and cough or are gasping for air.  Problems with coping with sadness, anxiety or depression. If you have concerns about hurting yourself or the baby, call your provider immediately. The Safe Place for Newborns law allows you to bring your baby to the hospital up to 7 days after birth, no questions asked.  You have questions or concerns after you return home.     Community Resources:  *[facility] can be reached by calling [phone]; they are located in the [facility] [address] in [place].     *[facility] can be reached at [phone]. Please call them for assistance with medical and financial assistance.

## 2022-08-12 LAB
CANNABINOIDS UR CFM-MCNC: 81 NG/ML
CARBOXYTHC/CREAT UR: 540 NG/MG CREAT
PATH REPORT.COMMENTS IMP SPEC: NORMAL
PATH REPORT.FINAL DX SPEC: NORMAL
PATH REPORT.GROSS SPEC: NORMAL
PATH REPORT.MICROSCOPIC SPEC OTHER STN: NORMAL
PATH REPORT.RELEVANT HX SPEC: NORMAL
PHOTO IMAGE: NORMAL

## 2022-10-03 ENCOUNTER — MEDICAL CORRESPONDENCE (OUTPATIENT)
Dept: HEALTH INFORMATION MANAGEMENT | Facility: HOSPITAL | Age: 37
End: 2022-10-03

## 2022-10-18 ENCOUNTER — TELEPHONE (OUTPATIENT)
Dept: EMERGENCY MEDICINE | Facility: HOSPITAL | Age: 37
End: 2022-10-18

## 2022-10-18 ENCOUNTER — HOSPITAL ENCOUNTER (EMERGENCY)
Facility: HOSPITAL | Age: 37
Discharge: HOME | End: 2022-10-18
Attending: EMERGENCY MEDICINE | Admitting: EMERGENCY MEDICINE
Payer: COMMERCIAL

## 2022-10-18 VITALS
OXYGEN SATURATION: 97 % | SYSTOLIC BLOOD PRESSURE: 107 MMHG | TEMPERATURE: 97.4 F | HEART RATE: 76 BPM | DIASTOLIC BLOOD PRESSURE: 74 MMHG | RESPIRATION RATE: 16 BRPM

## 2022-10-18 DIAGNOSIS — F41.9 ANXIETY: ICD-10-CM

## 2022-10-18 PROCEDURE — 99282 EMERGENCY DEPT VISIT SF MDM: CPT | Performed by: EMERGENCY MEDICINE

## 2022-10-18 PROCEDURE — 99282 EMERGENCY DEPT VISIT SF MDM: CPT

## 2022-10-18 ASSESSMENT — ACTIVITIES OF DAILY LIVING (ADL): ADLS_ACUITY_SCORE: 35

## 2022-10-18 NOTE — TELEPHONE ENCOUNTER
Care Transitions focused note:      Follow up call to assist patient with mental health resources.  Staff message to me from [facility] staff.      Message left with my name and contact information  Will await call back.    HENNY Monzon

## 2022-10-18 NOTE — ED NOTES
Patient provided written and verbal discharge instructions and patient verbalizes understanding. [place] [employer] here to bring patient home.

## 2022-10-18 NOTE — ED NOTES
"Patient arrives with [place] [employer]. [employer] officers advise they received a call about a possible attempt of self harm. Patient reports that she did call a crisis line (a number provided to her by her therapist at [facility]). Patient reports that she did endorse thoughts of wanting to cut wrists to the crisis line, but reports she just wanted \"to talk to somebody\" and states \"I was going to take care of things tomorrow\". Patient reports that she was getting ready for bed when she heard knocking on her door and \"somebody yelling Alfreda\" and patient found PD at her door. Patient agreeable to come the ED for evaluation. Officer Rasheed states they are not going to sign a hold at this time. Patient denies having thoughts of killing self, but admits that she has cut wrists in the past and shows this writer well healed scars on left inner wrist, which patient states is from attempt \"years ago\". Patient does endorse hx of cutting for relief from her feelings in the past. Patient also reports that she is 2 months post partem. PD advises that children are safe in care of another person and were not home at this time. Discussed with patient the process in the ED and patient is agreeable. [employer] officers state they will pick patient up if she is discharged home.   "

## 2022-10-18 NOTE — TELEPHONE ENCOUNTER
"----- Message from Maribel Suazo RN sent at 10/18/2022  2:02 AM CDT -----  Hello-  Could you please follow up with patient. Alfreda was brought in by PD after she called a crisis line to talk with someone. Patient has a history of cutting and apparently told the crisis line she was feeling like she wants to cut. Patient states when she's cut in the past it is for feeling \"relief\" not to end her life. Crisis line called [facility] to report a suicide attempt, but patient denied having feelings of wanting to kill herself and no plans. Patient is 2 months post partem. Patient is seen by someone at [facility], but she has missed a couple visits recently. I offered a call from case management to follow up with her to see if there are some community resources patient could benefit from in regard to any barriers to her getting to her appointments and with her new baby. Patient states \"I would like that\".    Thanks,  Elsy    "

## 2022-10-19 ENCOUNTER — TELEPHONE (OUTPATIENT)
Dept: EMERGENCY MEDICINE | Facility: HOSPITAL | Age: 37
End: 2022-10-19

## 2022-10-19 NOTE — TELEPHONE ENCOUNTER
Care Transitions focused note:      2nd call attempt to reach out for services.    No answer  Message left.    Will await call back.    HENNY Monzon

## 2022-10-19 NOTE — TELEPHONE ENCOUNTER
"----- Message from Maribel Suazo RN sent at 10/18/2022  2:02 AM CDT -----  Hello-  Could you please follow up with patient. Alfreda was brought in by PD after she called a crisis line to talk with someone. Patient has a history of cutting and apparently told the crisis line she was feeling like she wants to cut. Patient states when she's cut in the past it is for feeling \"relief\" not to end her life. Crisis line called [facility] to report a suicide attempt, but patient denied having feelings of wanting to kill herself and no plans. Patient is 2 months post partem. Patient is seen by someone at [facility], but she has missed a couple visits recently. I offered a call from case management to follow up with her to see if there are some community resources patient could benefit from in regard to any barriers to her getting to her appointments and with her new baby. Patient states \"I would like that\".    Thanks,  Elsy    "

## 2022-10-23 ASSESSMENT — ENCOUNTER SYMPTOMS
FEVER: 0
SHORTNESS OF BREATH: 0
CHILLS: 0
COUGH: 0
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
## 2023-02-20 ENCOUNTER — PATIENT OUTREACH (OUTPATIENT)
Dept: OTHER | Facility: HOSPITAL | Age: 38
End: 2023-02-20

## 2023-02-20 NOTE — TELEPHONE ENCOUNTER
Patient Quality Outreach    Patient is due for the following:   Depression  -  PHQ-9 needed    Next Steps:   Sent message to PCP Nurse Mary ALBERT LPN to call patient to obtain PHQ-9/JESSI-7 in telelphone encounter since patient is not MyChart active.     Type of outreach:    Sent uTaPhart message.      Questions for provider review:    None     Bethany Haynes RN

## 2023-04-11 ENCOUNTER — TELEPHONE (OUTPATIENT)
Dept: FAMILY MEDICINE | Facility: OTHER | Age: 38
End: 2023-04-11

## 2023-04-11 NOTE — TELEPHONE ENCOUNTER
Called patient to schedule a mental health follow up - per Bethany Haynes's Staff message. We do not have a valid number for this patient.

## 2023-09-17 ENCOUNTER — HOSPITAL ENCOUNTER (EMERGENCY)
Facility: HOSPITAL | Age: 38
Discharge: HOME | End: 2023-09-17
Payer: COMMERCIAL

## 2023-09-17 VITALS
RESPIRATION RATE: 18 BRPM | HEART RATE: 85 BPM | DIASTOLIC BLOOD PRESSURE: 68 MMHG | TEMPERATURE: 98.7 F | SYSTOLIC BLOOD PRESSURE: 111 MMHG | OXYGEN SATURATION: 96 %

## 2023-09-17 DIAGNOSIS — Z32.01 PREGNANCY TEST POSITIVE: ICD-10-CM

## 2023-09-17 LAB — HCG UR QL: POSITIVE

## 2023-09-17 PROCEDURE — 99213 OFFICE O/P EST LOW 20 MIN: CPT

## 2023-09-17 PROCEDURE — G0463 HOSPITAL OUTPT CLINIC VISIT: HCPCS

## 2023-09-17 PROCEDURE — 81025 URINE PREGNANCY TEST: CPT

## 2023-09-17 ASSESSMENT — ACTIVITIES OF DAILY LIVING (ADL): ADLS_ACUITY_SCORE: 33

## 2023-09-17 ASSESSMENT — ENCOUNTER SYMPTOMS
VOMITING: 0
DYSURIA: 0
NAUSEA: 1
ABDOMINAL PAIN: 0

## 2023-09-17 NOTE — ED TRIAGE NOTES
Patient presents today for a confirmation pregnancy test. She took two home tests today and they appeared to be positive. She has been feeling nauseous the past few days. She has irregular periods. Sexually active and not on birth control.      Triage Assessment       Row Name 09/17/23 9828       Triage Assessment (Adult)    Airway WDL WDL       Respiratory WDL    Respiratory WDL WDL       Skin Circulation/Temperature WDL    Skin Circulation/Temperature WDL WDL       Cardiac WDL    Cardiac WDL WDL       Peripheral/Neurovascular WDL    Peripheral Neurovascular WDL WDL       Cognitive/Neuro/Behavioral WDL    Cognitive/Neuro/Behavioral WDL WDL

## 2023-09-18 NOTE — DISCHARGE INSTRUCTIONS
Call the OB department to schedule an appointment. Return with any bleeding, abdominal pain, or other concerns.

## 2023-09-18 NOTE — ED PROVIDER NOTES
History     Chief Complaint   Patient presents with    Pregnancy Test     Patient has been nauseous the past few days. Had two positive test at home today. Here for a confirmation test.      HPI  Alfreda Cortez is a 38 year old female who presents to the urgent care requesting a pregnancy test. She is unsure of LMP. Has been experiencing nausea and had two positive home pregnancy tests today. She denies back pain, abdominal pain, vaginal bleeding, or other concerns.     Allergies:  No Known Allergies    Problem List:    Patient Active Problem List    Diagnosis Date Noted    Normal labor and delivery 2022     Priority: Medium     delivery delivered 2022     Priority: Medium     Placental abruption during labor  Aponte 22  Girl      Multigravida of advanced maternal age in second trimester 2022     Priority: Medium    JESSI (generalized anxiety disorder) 2022     Priority: Medium    Moderate episode of recurrent major depressive disorder (H) 2022     Priority: Medium    Morning sickness 2022     Priority: Medium    Supervision of other high risk pregnancies, second trimester 2022     Priority: Medium     phq,jessi  inconsistant visits -- previous delivery GF, [place]  Desires sterilization: counseling/forms signed. Plan interval procedure or at CS<  Not done due to emergency situation.  Plan interval procedure.   AMA:  declines screens.  Wkly APFT 36 wks  No Covid history or immunization  H/o abruption 35 wks last pregnancy (vaginal delivery)  UDS in labler      Hypovitaminosis D 2022     Priority: Medium    Low serum progesterone 2022     Priority: Medium        Past Medical History:    Past Medical History:   Diagnosis Date    Bipolar 1 disorder, manic, moderate (H)     Cannabis abuse     JESSI (generalized anxiety disorder)     Moderate episode of recurrent major depressive disorder (H)     PTSD (post-traumatic stress disorder)     Tobacco abuse        Past  Surgical History:    Past Surgical History:   Procedure Laterality Date     SECTION N/A 2022    Procedure:  SECTION;  Surgeon: Gagandeep Aponte MD;  Location: [facility]    wisdom teeth extraction Bilateral 2009    upper abd lower       Family History:    Family History   Problem Relation Age of Onset    No Known Problems Mother     Lung Cancer Father 79    No Known Problems Sister     No Known Problems Sister     No Known Problems Sister     Mental Illness Brother     No Known Problems Brother     No Known Problems Maternal Grandmother     No Known Problems Maternal Grandfather     No Known Problems Paternal Grandmother     No Known Problems Paternal Grandfather     Lung Cancer Maternal Aunt     No Known Problems Half-Brother     Lupus Half-Sister        Social History:  Marital Status:  Single [1]  Social History     Tobacco Use    Smoking status: Every Day     Packs/day: 0.50     Years: 16.00     Pack years: 8.00     Types: Cigarettes     Start date: 1998    Smokeless tobacco: Never   Vaping Use    Vaping Use: Former    Start date: 2020    Quit date: 2021    Substances: Nicotine, Flavoring    Devices: Disposable, Refillable tank   Substance Use Topics    Alcohol use: Yes     Comment: Last use in October        Medications:    Prenatal Vit-Fe Fumarate-FA (PRENATAL MULTIVITAMIN W/IRON) 27-0.8 MG tablet  escitalopram (LEXAPRO) 5 MG tablet  ibuprofen (ADVIL/MOTRIN) 800 MG tablet  iron (FERROUS GLUCONATE) 256 (28 Fe) MG tablet  oxyCODONE (ROXICODONE) 10 MG tablet  senna-docusate (SENOKOT-S/PERICOLACE) 8.6-50 MG tablet  vitamin D3 (CHOLECALCIFEROL) 50 mcg (2000 units) tablet          Review of Systems   Gastrointestinal:  Positive for nausea. Negative for abdominal pain and vomiting.   Genitourinary:  Negative for dysuria, vaginal bleeding, vaginal discharge and vaginal pain.   All other systems reviewed and are negative.      Physical Exam   BP: 111/68  Pulse: 85  Temp: 98.7  F (37.1   C)  Resp: 18  SpO2: 96 %      Physical Exam  Vitals and nursing note reviewed.   Constitutional:       General: She is not in acute distress.     Appearance: Normal appearance. She is not ill-appearing or toxic-appearing.   Neurological:      General: No focal deficit present.      Mental Status: She is alert and oriented to person, place, and time.   Psychiatric:         Mood and Affect: Mood normal.         Behavior: Behavior normal.         Thought Content: Thought content normal.         Judgment: Judgment normal.         ED Course                 Procedures                Results for orders placed or performed during the hospital encounter of 09/17/23 (from the past 24 hour(s))   HCG qualitative urine   Result Value Ref Range    hCG Urine Qualitative Positive (A) Negative       Medications - No data to display    Assessments & Plan (with Medical Decision Making)     I have reviewed the nursing notes.    I have reviewed the findings, diagnosis, plan and need for follow up with the patient.  Alfreda Cortez is a 38 year old female who presents to the urgent care requesting a pregnancy test. She is unsure of LMP. Has been experiencing nausea and had two positive home pregnancy tests today. She denies back pain, abdominal pain, vaginal bleeding, or other concerns.     MDM: urine HCG positive.   VSS and afebrile. She denies any other concerns today. No noted distressed. She will call and schedule with OB tomorrow. Return precautions discussed. She is in agreement with plan.     (Z32.01) Pregnancy test positive  Plan: Call the OB department to schedule an appointment. Return with any bleeding, abdominal pain, or other concerns. Understanding verbalized.            New Prescriptions    No medications on file       Final diagnoses:   Pregnancy test positive       9/17/2023   [facility]       Purnima Soto NP  09/17/23 1934

## 2023-10-09 LAB
ABO/RH(D): NORMAL
ANTIBODY SCREEN: NEGATIVE
SPECIMEN EXPIRATION DATE: NORMAL

## 2023-10-10 ENCOUNTER — PRENATAL OFFICE VISIT (OUTPATIENT)
Dept: OBGYN | Facility: OTHER | Age: 38
End: 2023-10-10
Attending: OBSTETRICS & GYNECOLOGY
Payer: COMMERCIAL

## 2023-10-10 VITALS
HEART RATE: 65 BPM | BODY MASS INDEX: 31.49 KG/M2 | SYSTOLIC BLOOD PRESSURE: 110 MMHG | DIASTOLIC BLOOD PRESSURE: 63 MMHG | WEIGHT: 189 LBS | HEIGHT: 65 IN | OXYGEN SATURATION: 98 %

## 2023-10-10 DIAGNOSIS — Z32.01 PREGNANCY TEST POSITIVE: Primary | ICD-10-CM

## 2023-10-10 DIAGNOSIS — Z34.80 PREGNANCY IN MULTIGRAVIDA: ICD-10-CM

## 2023-10-10 DIAGNOSIS — O09.521 MULTIGRAVIDA OF ADVANCED MATERNAL AGE IN FIRST TRIMESTER: ICD-10-CM

## 2023-10-10 DIAGNOSIS — Z34.91 PREGNANCY WITH UNCERTAIN DATES IN FIRST TRIMESTER: ICD-10-CM

## 2023-10-10 LAB
ALBUMIN UR-MCNC: 10 MG/DL
AMPHETAMINES UR QL SCN: ABNORMAL
APPEARANCE UR: CLEAR
BARBITURATES UR QL SCN: ABNORMAL
BENZODIAZ UR QL SCN: ABNORMAL
BILIRUB UR QL STRIP: NEGATIVE
BZE UR QL SCN: ABNORMAL
CANNABINOIDS UR QL SCN: ABNORMAL
COLOR UR AUTO: YELLOW
CREAT UR-MCNC: 199 MG/DL
ERYTHROCYTE [DISTWIDTH] IN BLOOD BY AUTOMATED COUNT: 14 % (ref 10–15)
FENTANYL UR QL: ABNORMAL
GLUCOSE UR STRIP-MCNC: NEGATIVE MG/DL
HCT VFR BLD AUTO: 42 % (ref 35–47)
HGB BLD-MCNC: 14.1 G/DL (ref 11.7–15.7)
HGB UR QL STRIP: NEGATIVE
HYALINE CASTS: 1 /LPF
KETONES UR STRIP-MCNC: NEGATIVE MG/DL
LEUKOCYTE ESTERASE UR QL STRIP: NEGATIVE
MCH RBC QN AUTO: 29.4 PG (ref 26.5–33)
MCHC RBC AUTO-ENTMCNC: 33.6 G/DL (ref 31.5–36.5)
MCV RBC AUTO: 88 FL (ref 78–100)
MUCOUS THREADS #/AREA URNS LPF: PRESENT /LPF
NITRATE UR QL: NEGATIVE
OPIATES UR QL SCN: ABNORMAL
PCP QUAL URINE (ROCHE): ABNORMAL
PH UR STRIP: 6 [PH] (ref 4.7–8)
PLATELET # BLD AUTO: 343 10E3/UL (ref 150–450)
RBC # BLD AUTO: 4.79 10E6/UL (ref 3.8–5.2)
RBC URINE: 1 /HPF
SP GR UR STRIP: 1.03 (ref 1–1.03)
SQUAMOUS EPITHELIAL: 1 /HPF
UROBILINOGEN UR STRIP-MCNC: NORMAL MG/DL
WBC # BLD AUTO: 12 10E3/UL (ref 4–11)
WBC URINE: 3 /HPF

## 2023-10-10 PROCEDURE — 80349 CANNABINOIDS NATURAL: CPT | Mod: ZL,59 | Performed by: OBSTETRICS & GYNECOLOGY

## 2023-10-10 PROCEDURE — 76817 TRANSVAGINAL US OBSTETRIC: CPT | Performed by: OBSTETRICS & GYNECOLOGY

## 2023-10-10 PROCEDURE — 86901 BLOOD TYPING SEROLOGIC RH(D): CPT | Performed by: OBSTETRICS & GYNECOLOGY

## 2023-10-10 PROCEDURE — 85027 COMPLETE CBC AUTOMATED: CPT | Mod: ZL | Performed by: OBSTETRICS & GYNECOLOGY

## 2023-10-10 PROCEDURE — 99207 PR FIRST OB VISIT: CPT | Performed by: OBSTETRICS & GYNECOLOGY

## 2023-10-10 PROCEDURE — 87340 HEPATITIS B SURFACE AG IA: CPT | Mod: ZL | Performed by: OBSTETRICS & GYNECOLOGY

## 2023-10-10 PROCEDURE — 81001 URINALYSIS AUTO W/SCOPE: CPT | Mod: ZL | Performed by: OBSTETRICS & GYNECOLOGY

## 2023-10-10 PROCEDURE — 86762 RUBELLA ANTIBODY: CPT | Mod: ZL | Performed by: OBSTETRICS & GYNECOLOGY

## 2023-10-10 PROCEDURE — 86803 HEPATITIS C AB TEST: CPT | Mod: ZL | Performed by: OBSTETRICS & GYNECOLOGY

## 2023-10-10 PROCEDURE — 36415 COLL VENOUS BLD VENIPUNCTURE: CPT | Mod: ZL | Performed by: OBSTETRICS & GYNECOLOGY

## 2023-10-10 PROCEDURE — 80307 DRUG TEST PRSMV CHEM ANLYZR: CPT | Mod: ZL | Performed by: OBSTETRICS & GYNECOLOGY

## 2023-10-10 PROCEDURE — 86780 TREPONEMA PALLIDUM: CPT | Mod: ZL | Performed by: OBSTETRICS & GYNECOLOGY

## 2023-10-10 PROCEDURE — 87389 HIV-1 AG W/HIV-1&-2 AB AG IA: CPT | Mod: ZL | Performed by: OBSTETRICS & GYNECOLOGY

## 2023-10-10 PROCEDURE — 87086 URINE CULTURE/COLONY COUNT: CPT | Mod: ZL | Performed by: OBSTETRICS & GYNECOLOGY

## 2023-10-10 PROCEDURE — 86850 RBC ANTIBODY SCREEN: CPT | Performed by: OBSTETRICS & GYNECOLOGY

## 2023-10-10 ASSESSMENT — PAIN SCALES - GENERAL: PAINLEVEL: NO PAIN (0)
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
## 2023-10-12 LAB
BACTERIA UR CULT: NORMAL
HBV SURFACE AG SERPL QL IA: NONREACTIVE
HCV AB SERPL QL IA: NONREACTIVE
HIV 1+2 AB+HIV1 P24 AG SERPL QL IA: NONREACTIVE
RUBV IGG SERPL QL IA: 2.52 INDEX
RUBV IGG SERPL QL IA: POSITIVE
T PALLIDUM AB SER QL: NONREACTIVE

## 2023-10-13 LAB
CANNABINOIDS UR CFM-MCNC: 524 NG/ML
CARBOXYTHC/CREAT UR: 263 NG/MG CREAT

## 2023-11-07 ENCOUNTER — PRENATAL OFFICE VISIT (OUTPATIENT)
Dept: OBGYN | Facility: OTHER | Age: 38
End: 2023-11-07
Attending: NURSE PRACTITIONER
Payer: COMMERCIAL

## 2023-11-07 VITALS
WEIGHT: 193 LBS | BODY MASS INDEX: 32.15 KG/M2 | SYSTOLIC BLOOD PRESSURE: 120 MMHG | DIASTOLIC BLOOD PRESSURE: 70 MMHG | OXYGEN SATURATION: 98 % | HEART RATE: 86 BPM | HEIGHT: 65 IN

## 2023-11-07 DIAGNOSIS — Z34.91 PREGNANCY WITH UNCERTAIN DATES IN FIRST TRIMESTER: Primary | ICD-10-CM

## 2023-11-07 DIAGNOSIS — Z12.4 CERVICAL CANCER SCREENING: ICD-10-CM

## 2023-11-07 DIAGNOSIS — Z23 NEED FOR PROPHYLACTIC VACCINATION AND INOCULATION AGAINST INFLUENZA: ICD-10-CM

## 2023-11-07 DIAGNOSIS — O09.521 HIGH RISK PREGNANCY, MULTIGRAVIDA OF ADVANCED MATERNAL AGE IN FIRST TRIMESTER: ICD-10-CM

## 2023-11-07 DIAGNOSIS — O23.591 TRICHOMONAL VAGINITIS DURING PREGNANCY IN FIRST TRIMESTER: ICD-10-CM

## 2023-11-07 DIAGNOSIS — A59.01 TRICHOMONAL VAGINITIS DURING PREGNANCY IN FIRST TRIMESTER: ICD-10-CM

## 2023-11-07 LAB
C TRACH DNA SPEC QL PROBE+SIG AMP: NEGATIVE
CLUE CELLS: PRESENT
N GONORRHOEA DNA SPEC QL NAA+PROBE: NEGATIVE
TRICHOMONAS, WET PREP: PRESENT
WBC'S/HIGH POWER FIELD, WET PREP: ABNORMAL
YEAST, WET PREP: ABNORMAL

## 2023-11-07 PROCEDURE — G0008 ADMIN INFLUENZA VIRUS VAC: HCPCS

## 2023-11-07 PROCEDURE — 87624 HPV HI-RISK TYP POOLED RSLT: CPT | Mod: ZL | Performed by: NURSE PRACTITIONER

## 2023-11-07 PROCEDURE — 87210 SMEAR WET MOUNT SALINE/INK: CPT | Mod: ZL | Performed by: NURSE PRACTITIONER

## 2023-11-07 PROCEDURE — G0123 SCREEN CERV/VAG THIN LAYER: HCPCS | Mod: ZL | Performed by: NURSE PRACTITIONER

## 2023-11-07 PROCEDURE — G0463 HOSPITAL OUTPT CLINIC VISIT: HCPCS | Mod: 25

## 2023-11-07 PROCEDURE — 90686 IIV4 VACC NO PRSV 0.5 ML IM: CPT

## 2023-11-07 PROCEDURE — 99207 PR PRENATAL VISIT: CPT | Performed by: NURSE PRACTITIONER

## 2023-11-07 PROCEDURE — 87491 CHLMYD TRACH DNA AMP PROBE: CPT | Mod: ZL | Performed by: NURSE PRACTITIONER

## 2023-11-07 ASSESSMENT — PAIN SCALES - GENERAL: PAINLEVEL: NO PAIN (0)

## 2023-11-07 NOTE — LETTER
November 21, 2023    Alfreda Cortez  [address] [facility] [address]      Dear ,      This letter is in regards to your recent cervical cancer screening (Pap smear and HPV test).    Your Pap smear result was reported as Normal.     Your cervical sample was also tested for the presence of Human Papillomavirus (HPV). Your HPV test is NEGATIVE for high risk HPV, meaning that no HPV was found at this time.       If you have additional questions regarding this result, please call our  nurse, Elham at [phone].    Please continue to be seen every year for an annual physical exam and other preventative tests.         Sincerely,    Tracie Moraes NP

## 2023-11-08 DIAGNOSIS — A59.9 TRICHOMONAS INFECTION: Primary | ICD-10-CM

## 2023-11-08 DIAGNOSIS — O23.599 BACTERIAL VAGINOSIS IN PREGNANCY: ICD-10-CM

## 2023-11-08 DIAGNOSIS — B96.89 BACTERIAL VAGINOSIS IN PREGNANCY: ICD-10-CM

## 2023-11-08 RX ORDER — METRONIDAZOLE 500 MG/1
500 TABLET ORAL 2 TIMES DAILY
Qty: 14 TABLET | Refills: 0 | Status: SHIPPED | OUTPATIENT
Start: 2023-11-08 | End: 2023-11-16

## 2023-11-09 PROBLEM — O09.522 MULTIGRAVIDA OF ADVANCED MATERNAL AGE IN SECOND TRIMESTER: Status: RESOLVED | Noted: 2022-03-16 | Resolved: 2023-11-09

## 2023-11-09 PROBLEM — A59.01 TRICHOMONAL VAGINITIS DURING PREGNANCY: Status: ACTIVE | Noted: 2023-11-09

## 2023-11-09 PROBLEM — O23.599 TRICHOMONAL VAGINITIS DURING PREGNANCY: Status: ACTIVE | Noted: 2023-11-09

## 2023-11-09 PROBLEM — O21.0 MORNING SICKNESS: Status: RESOLVED | Noted: 2022-02-07 | Resolved: 2023-11-09

## 2023-11-09 PROBLEM — O09.892 SUPERVISION OF OTHER HIGH RISK PREGNANCIES, SECOND TRIMESTER: Status: RESOLVED | Noted: 2022-02-07 | Resolved: 2023-11-09

## 2023-11-09 PROBLEM — O09.521 HIGH RISK PREGNANCY, MULTIGRAVIDA OF ADVANCED MATERNAL AGE IN FIRST TRIMESTER: Status: ACTIVE | Noted: 2023-11-09

## 2023-11-09 PROBLEM — R79.89 LOW SERUM PROGESTERONE: Status: RESOLVED | Noted: 2022-02-07 | Resolved: 2023-11-09

## 2023-11-09 ASSESSMENT — ANXIETY QUESTIONNAIRES
6. BECOMING EASILY ANNOYED OR IRRITABLE: NOT AT ALL
GAD7 TOTAL SCORE: 2
5. BEING SO RESTLESS THAT IT IS HARD TO SIT STILL: NOT AT ALL
1. FEELING NERVOUS, ANXIOUS, OR ON EDGE: NOT AT ALL
3. WORRYING TOO MUCH ABOUT DIFFERENT THINGS: SEVERAL DAYS
2. NOT BEING ABLE TO STOP OR CONTROL WORRYING: SEVERAL DAYS
7. FEELING AFRAID AS IF SOMETHING AWFUL MIGHT HAPPEN: NOT AT ALL
GAD7 TOTAL SCORE: 2

## 2023-11-09 ASSESSMENT — PATIENT HEALTH QUESTIONNAIRE - PHQ9
SUM OF ALL RESPONSES TO PHQ QUESTIONS 1-9: 2
5. POOR APPETITE OR OVEREATING: NOT AT ALL

## 2023-11-09 NOTE — PROGRESS NOTES
Have you had or do you currently have:    - Diabetes? n  - Hypertension? n  - Heart disease, mitral valve prolapse, or rheumatic fever?  n  - An autoimmune disease such as lupus or rheumatoid arthritis?  n  - Kidney disease, urinary tract infection?  n  - Epilepsy, seizures, or spells?  n  - Migraine headaches?  n  - Any other neurological problems?  n  - Have you ever been treated for depression?  y  - Are you having problems with crying spells or loss of self-esteem?  n  - Have you ever required psychiatric care?  y  - Have you ever had hepatitis, liver disease, or jaundice?  n  - Have you ever been treated for blood clots in your veins, deep vein thrombosis, inflammation in the veins, thrombosis, phelbitis, pulmonary embolism or varicosities?  n  - Have you had excessive bleeding after surgery or dental work?  n  - Do you bleed more than other women after a cut or scratch?  n  - Do you have a history or anemia?  n  - Have you ever had thyroid problems or take thyroid medication?  n  - Do you have any endocrine problems?  n  - Have you every been in a major accident or suffered serious trauma?  n  - Within the last year, has anyone hit, slapped, kicked, or otherwise hurt you?  n  - In the last year, has anyone forced you to have sex when you didn't want to?  n  - Have you every received a blood transfusion?  n  - Would you refuse a blood transfusion if a doctor judged it to be medically necessary?  n  - Does anyone in your home smoke? n  - Do you use tobacco products?  n  - Do you drink beer, wine, or hard liquor?  n  - Do you use any of the following: marijuana, speed, cocaine, heroin, hallucinogens, or other drugs?  y  - Is your blood type RH negative?  n  - Have you ever had asthma?  n  - Have you ever had tuberculosis?  n  - Do you have any allergies to drugs or over-the-counter medications?  n  - Allergies: dust mites, aspartame, ethanol, venlafaxine hydrochloride, sertraline?  n  - Have you had any breast  problems?  n  - Have you ever breast-fed?  y  - Have you had any gynecological surgical procedures such as cervical conization, LEEP, laser treatment, cryosurgery of the cervix, or a dilatation and curettage, etc?  n  - Have you ever had any other surgical procedures?  n  - Have you ever had any anesthetic complications?  n  - Have you ever had an abnormal pap smear?  n  - Do you have a history of abnormalities of the uterus? n  - Did your mother take ELISE or any other hormones when she was pregnant with you?  n  - Did it take you more than one year to become pregnant?  n  - Have you ever been evaluated or treated for infertility?  n  - Is there a history of medical problems in your family, which you feel might adversely affect your health or pregnancy?  n  - Do you have any other problems we have not asked about which you feel may be important to this pregnancy?  n    Symptoms since last menstrual period  - Do you currently have any of the following symptoms: abdominal pain, blood in the stool or urine, chest pain, shortness of breath, coughing or vomiting up blood, you heart is racing or skipping beats, nausea and vomiting, pain on urination, or vaginal discharge or bleeding?  n    Genetic screening  Has the patient, baby's father, or anyone in either family had:  - Thalassemia (Italian, Greek, Mediterranean, or  background only) and an MCV result less than 80?  n  - Neural tube defect such as meningomyelocele, spina bifida, or anencephaly?  n  - Congential heart defect?  n  - Down's syndrome?  n  - Chris-Sachs disease ( Religion, Cajun, Spanish-[place])?  n  - Sickle cell disease or trait () ?  n  - Hemophilia or other inherited problems of blood?  n  - Muscular dystrophy?  n  - Cystic fibrosis?  n  - [place]'s chorea?  n  - Mental retardation/autism?  n   If yes, was the person tested for Fragile X?  n  - Any other inherited genetic or chromosomal disorder?  n  - Maternal metabolic disorder (e.g.  insulin- dependent diabetes, PKU)?  n  - A child with birth defects not listed above?  n  - Recurrent pregnancy loss, or a stillbirth?  n  - Has the patient had any medications/street drugs/alcohol since her last menstrual period?  n  - Does the patient or baby's father have any other genetic risk?  n    Infection history  - Have you ever been treated for tuberculosis?  n  - Have you every had a positive skin test for tuberculosis?  n  - Do you live with someone who has tuberculosis?  n  - Have you ever been exposed to tuberculosis?  n  - Do you have genital herpes?  n  - Does your partner have genital herpes?  n  - Have you had a rash or viral illness since your last period?  n  - Have you ever had gonorrhea, chlamydia, syphilis, venereal warts, trichomoniasis, pelvic inflammatory disease, or any other sexually transmitted disease?  y  - Have you had chicken pox?  n  - Have you been vaccinated against chicken pox?  y  - Have you had any other infectious diseases?  N  RADHA SPAIN LPN
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
## 2023-11-15 ENCOUNTER — LAB (OUTPATIENT)
Dept: LAB | Facility: OTHER | Age: 38
End: 2023-11-15
Payer: COMMERCIAL

## 2023-11-15 DIAGNOSIS — Z34.91 PREGNANCY WITH UNCERTAIN DATES IN FIRST TRIMESTER: ICD-10-CM

## 2023-11-15 PROCEDURE — 36415 COLL VENOUS BLD VENIPUNCTURE: CPT | Mod: ZL

## 2023-11-16 DIAGNOSIS — O23.599 BACTERIAL VAGINOSIS IN PREGNANCY: ICD-10-CM

## 2023-11-16 DIAGNOSIS — B96.89 BACTERIAL VAGINOSIS IN PREGNANCY: ICD-10-CM

## 2023-11-16 DIAGNOSIS — A59.9 TRICHOMONAS INFECTION: ICD-10-CM

## 2023-11-16 RX ORDER — METRONIDAZOLE 500 MG/1
500 TABLET ORAL 2 TIMES DAILY
Qty: 2 TABLET | Refills: 0 | Status: SHIPPED | OUTPATIENT
Start: 2023-11-16 | End: 2024-01-04
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
## 2023-11-17 LAB
BKR LAB AP GYN ADEQUACY: NORMAL
BKR LAB AP GYN INTERPRETATION: NORMAL
BKR LAB AP GYN OTHER FINDINGS: NORMAL
BKR LAB AP HPV REFLEX: NORMAL
BKR LAB AP PREVIOUS ABNORMAL: NORMAL
PATH REPORT.COMMENTS IMP SPEC: NORMAL
PATH REPORT.COMMENTS IMP SPEC: NORMAL
PATH REPORT.RELEVANT HX SPEC: NORMAL

## 2023-11-22 LAB — SCANNED LAB RESULT: NORMAL

## 2024-01-04 ENCOUNTER — PRENATAL OFFICE VISIT (OUTPATIENT)
Dept: OBGYN | Facility: OTHER | Age: 39
End: 2024-01-04
Attending: OBSTETRICS & GYNECOLOGY
Payer: COMMERCIAL

## 2024-01-04 VITALS
WEIGHT: 209 LBS | HEIGHT: 65 IN | OXYGEN SATURATION: 96 % | DIASTOLIC BLOOD PRESSURE: 78 MMHG | BODY MASS INDEX: 34.82 KG/M2 | SYSTOLIC BLOOD PRESSURE: 123 MMHG | HEART RATE: 96 BPM

## 2024-01-04 DIAGNOSIS — O09.92 HIGH-RISK PREGNANCY IN SECOND TRIMESTER: ICD-10-CM

## 2024-01-04 DIAGNOSIS — A59.9 TRICHOMONAS INFECTION: Primary | ICD-10-CM

## 2024-01-04 LAB
BACTERIAL VAGINOSIS VAG-IMP: POSITIVE
CANDIDA DNA VAG QL NAA+PROBE: DETECTED
CANDIDA GLABRATA / CANDIDA KRUSEI DNA: NOT DETECTED
T VAGINALIS DNA VAG QL NAA+PROBE: NOT DETECTED

## 2024-01-04 PROCEDURE — G0463 HOSPITAL OUTPT CLINIC VISIT: HCPCS

## 2024-01-04 PROCEDURE — 0352U MULTIPLEX VAGINAL PANEL BY PCR: CPT | Mod: ZL | Performed by: OBSTETRICS & GYNECOLOGY

## 2024-01-04 PROCEDURE — 99207 PR PRENATAL VISIT: CPT | Performed by: OBSTETRICS & GYNECOLOGY

## 2024-01-04 ASSESSMENT — PAIN SCALES - GENERAL: PAINLEVEL: NO PAIN (0)

## 2024-01-05 DIAGNOSIS — B96.89 BV (BACTERIAL VAGINOSIS): ICD-10-CM

## 2024-01-05 DIAGNOSIS — B37.31 YEAST INFECTION OF THE VAGINA: Primary | ICD-10-CM

## 2024-01-05 DIAGNOSIS — N76.0 BV (BACTERIAL VAGINOSIS): ICD-10-CM

## 2024-01-05 RX ORDER — METRONIDAZOLE 250 MG/1
250 TABLET ORAL 3 TIMES DAILY
Qty: 21 TABLET | Refills: 0 | Status: SHIPPED | OUTPATIENT
Start: 2024-01-05 | End: 2024-01-12

## 2024-01-05 NOTE — PROGRESS NOTES
Doing well.  No concerns today.  Missed last couple of appts  NIPT nl  US for full fetal anatomical survey ordered.  Return to clinic in 4 weeks  Wet prep done. Previous Trichomonas treated.  No sx.   Gagandeep Aponte MD  1/5/2024

## 2024-01-08 ENCOUNTER — HOSPITAL ENCOUNTER (OUTPATIENT)
Dept: ULTRASOUND IMAGING | Facility: HOSPITAL | Age: 39
Discharge: HOME | End: 2024-01-08
Attending: OBSTETRICS & GYNECOLOGY | Admitting: OBSTETRICS & GYNECOLOGY
Payer: COMMERCIAL

## 2024-01-08 DIAGNOSIS — O09.92 HIGH-RISK PREGNANCY IN SECOND TRIMESTER: ICD-10-CM

## 2024-01-08 PROCEDURE — 76805 OB US >/= 14 WKS SNGL FETUS: CPT

## 2024-02-01 ENCOUNTER — PRENATAL OFFICE VISIT (OUTPATIENT)
Dept: OBGYN | Facility: OTHER | Age: 39
End: 2024-02-01
Attending: OBSTETRICS & GYNECOLOGY
Payer: COMMERCIAL

## 2024-02-01 VITALS
BODY MASS INDEX: 35.65 KG/M2 | HEART RATE: 72 BPM | DIASTOLIC BLOOD PRESSURE: 60 MMHG | SYSTOLIC BLOOD PRESSURE: 112 MMHG | WEIGHT: 214 LBS | OXYGEN SATURATION: 98 % | HEIGHT: 65 IN

## 2024-02-01 DIAGNOSIS — O09.522 HIGH RISK PREGNANCY, MULTIGRAVIDA OF ADVANCED MATERNAL AGE IN SECOND TRIMESTER: Primary | ICD-10-CM

## 2024-02-01 PROCEDURE — 99207 PR PRENATAL VISIT: CPT | Performed by: OBSTETRICS & GYNECOLOGY

## 2024-02-01 PROCEDURE — G0463 HOSPITAL OUTPT CLINIC VISIT: HCPCS

## 2024-02-01 ASSESSMENT — PAIN SCALES - GENERAL: PAINLEVEL: NO PAIN (0)

## 2024-02-01 NOTE — PROGRESS NOTES
Doing well.  No concerns today.  Denies PTL sx, vb, lof  Reminded of upcoming labs including 1 hour GTT  Reviewed recent US-no concerns with anatomical survey.  Return to clinic in 4 weeks    Gagandeep Aponte MD  2/1/2024

## 2024-02-29 ENCOUNTER — PRENATAL OFFICE VISIT (OUTPATIENT)
Dept: OBGYN | Facility: OTHER | Age: 39
End: 2024-02-29
Attending: OBSTETRICS & GYNECOLOGY
Payer: COMMERCIAL

## 2024-02-29 ENCOUNTER — LAB (OUTPATIENT)
Dept: LAB | Facility: OTHER | Age: 39
End: 2024-02-29
Attending: OBSTETRICS & GYNECOLOGY
Payer: COMMERCIAL

## 2024-02-29 VITALS
WEIGHT: 213 LBS | HEIGHT: 65 IN | OXYGEN SATURATION: 97 % | HEART RATE: 96 BPM | SYSTOLIC BLOOD PRESSURE: 110 MMHG | DIASTOLIC BLOOD PRESSURE: 66 MMHG | BODY MASS INDEX: 35.49 KG/M2

## 2024-02-29 DIAGNOSIS — N76.0 BV (BACTERIAL VAGINOSIS): Primary | ICD-10-CM

## 2024-02-29 DIAGNOSIS — O09.522 HIGH RISK PREGNANCY, MULTIGRAVIDA OF ADVANCED MATERNAL AGE IN SECOND TRIMESTER: ICD-10-CM

## 2024-02-29 DIAGNOSIS — B96.89 BV (BACTERIAL VAGINOSIS): Primary | ICD-10-CM

## 2024-02-29 DIAGNOSIS — Z20.2 POSSIBLE EXPOSURE TO STD: Primary | ICD-10-CM

## 2024-02-29 LAB
ANTIBODY SCREEN: NEGATIVE
BACTERIAL VAGINOSIS VAG-IMP: POSITIVE
C TRACH DNA SPEC QL PROBE+SIG AMP: NEGATIVE
CANDIDA DNA VAG QL NAA+PROBE: NOT DETECTED
CANDIDA GLABRATA / CANDIDA KRUSEI DNA: NOT DETECTED
ERYTHROCYTE [DISTWIDTH] IN BLOOD BY AUTOMATED COUNT: 14 % (ref 10–15)
GLUCOSE 1H P 50 G GLC PO SERPL-MCNC: 120 MG/DL (ref 70–129)
HCT VFR BLD AUTO: 34.8 % (ref 35–47)
HGB BLD-MCNC: 11.8 G/DL (ref 11.7–15.7)
MCH RBC QN AUTO: 28.2 PG (ref 26.5–33)
MCHC RBC AUTO-ENTMCNC: 33.9 G/DL (ref 31.5–36.5)
MCV RBC AUTO: 83 FL (ref 78–100)
N GONORRHOEA DNA SPEC QL NAA+PROBE: NEGATIVE
PLATELET # BLD AUTO: 337 10E3/UL (ref 150–450)
RBC # BLD AUTO: 4.18 10E6/UL (ref 3.8–5.2)
SPECIMEN EXPIRATION DATE: NORMAL
T VAGINALIS DNA VAG QL NAA+PROBE: NOT DETECTED
WBC # BLD AUTO: 12.9 10E3/UL (ref 4–11)

## 2024-02-29 PROCEDURE — 86780 TREPONEMA PALLIDUM: CPT | Mod: ZL

## 2024-02-29 PROCEDURE — 87389 HIV-1 AG W/HIV-1&-2 AB AG IA: CPT | Mod: ZL | Performed by: OBSTETRICS & GYNECOLOGY

## 2024-02-29 PROCEDURE — 99207 PR PRENATAL VISIT: CPT | Performed by: OBSTETRICS & GYNECOLOGY

## 2024-02-29 PROCEDURE — 86850 RBC ANTIBODY SCREEN: CPT

## 2024-02-29 PROCEDURE — 82950 GLUCOSE TEST: CPT | Mod: ZL

## 2024-02-29 PROCEDURE — 85041 AUTOMATED RBC COUNT: CPT | Mod: ZL

## 2024-02-29 PROCEDURE — 87340 HEPATITIS B SURFACE AG IA: CPT | Mod: ZL | Performed by: OBSTETRICS & GYNECOLOGY

## 2024-02-29 PROCEDURE — 87491 CHLMYD TRACH DNA AMP PROBE: CPT | Mod: ZL | Performed by: OBSTETRICS & GYNECOLOGY

## 2024-02-29 PROCEDURE — 86762 RUBELLA ANTIBODY: CPT | Mod: ZL | Performed by: OBSTETRICS & GYNECOLOGY

## 2024-02-29 PROCEDURE — 86803 HEPATITIS C AB TEST: CPT | Mod: ZL | Performed by: OBSTETRICS & GYNECOLOGY

## 2024-02-29 PROCEDURE — 36415 COLL VENOUS BLD VENIPUNCTURE: CPT | Mod: ZL | Performed by: OBSTETRICS & GYNECOLOGY

## 2024-02-29 PROCEDURE — 0352U MULTIPLEX VAGINAL PANEL BY PCR: CPT | Mod: ZL | Performed by: OBSTETRICS & GYNECOLOGY

## 2024-02-29 RX ORDER — METRONIDAZOLE 250 MG/1
250 TABLET ORAL 3 TIMES DAILY
Qty: 21 TABLET | Refills: 0 | Status: SHIPPED | OUTPATIENT
Start: 2024-02-29 | End: 2024-03-07

## 2024-02-29 RX ORDER — PRENATAL VIT/IRON FUM/FOLIC AC 27MG-0.8MG
1 TABLET ORAL DAILY
Qty: 90 TABLET | Refills: 3 | Status: SHIPPED | OUTPATIENT
Start: 2024-02-29

## 2024-02-29 NOTE — PROGRESS NOTES
Doing well.  No concerns today.  Requests STD screening, exposure, partner.   Cervical cx, multiplex, and blood STD screen rdered.   1 hour GTT done today along with other necessary labs.  Prenatal flowsheet information is reviewed.  Discussed kick counts and fetal movement.  RTC in 2 weeks    Denies PTL sx, vb, lof  Gagandeep Aponte MD  2/29/2024

## 2024-03-02 LAB
RUBV IGG SERPL QL IA: 1.52 INDEX
RUBV IGG SERPL QL IA: POSITIVE
T PALLIDUM AB SER QL: NONREACTIVE

## 2024-03-03 LAB
HBV SURFACE AG SERPL QL IA: NONREACTIVE
HCV AB SERPL QL IA: NONREACTIVE
HIV 1+2 AB+HIV1 P24 AG SERPL QL IA: NONREACTIVE

## 2024-03-22 ENCOUNTER — PRENATAL OFFICE VISIT (OUTPATIENT)
Dept: OBGYN | Facility: OTHER | Age: 39
End: 2024-03-22
Attending: NURSE PRACTITIONER
Payer: COMMERCIAL

## 2024-03-22 VITALS
DIASTOLIC BLOOD PRESSURE: 50 MMHG | BODY MASS INDEX: 35.91 KG/M2 | WEIGHT: 215.8 LBS | OXYGEN SATURATION: 97 % | SYSTOLIC BLOOD PRESSURE: 90 MMHG | HEART RATE: 82 BPM

## 2024-03-22 DIAGNOSIS — Z23 NEED FOR VACCINATION: Primary | ICD-10-CM

## 2024-03-22 PROCEDURE — G0463 HOSPITAL OUTPT CLINIC VISIT: HCPCS

## 2024-03-22 PROCEDURE — 90715 TDAP VACCINE 7 YRS/> IM: CPT

## 2024-03-22 PROCEDURE — 99207 PR PRENATAL VISIT: CPT | Performed by: NURSE PRACTITIONER

## 2024-03-22 ASSESSMENT — PAIN SCALES - GENERAL: PAINLEVEL: NO PAIN (0)

## 2024-03-22 NOTE — PROGRESS NOTES
Denies concerns today.  Baby active.  Screenings / labs reviewed.  Tdap given.  Denies HA, cramping, vb. Declines third trimester teaching.  Return in 2 weeks.

## 2024-04-05 ENCOUNTER — PRENATAL OFFICE VISIT (OUTPATIENT)
Dept: OBGYN | Facility: OTHER | Age: 39
End: 2024-04-05
Attending: NURSE PRACTITIONER
Payer: COMMERCIAL

## 2024-04-05 VITALS
DIASTOLIC BLOOD PRESSURE: 58 MMHG | BODY MASS INDEX: 36.82 KG/M2 | OXYGEN SATURATION: 98 % | HEART RATE: 83 BPM | HEIGHT: 65 IN | SYSTOLIC BLOOD PRESSURE: 130 MMHG | WEIGHT: 221 LBS

## 2024-04-05 DIAGNOSIS — O09.93 HIGH-RISK PREGNANCY, THIRD TRIMESTER: Primary | ICD-10-CM

## 2024-04-05 PROCEDURE — G0463 HOSPITAL OUTPT CLINIC VISIT: HCPCS

## 2024-04-05 PROCEDURE — 99207 PR PRENATAL VISIT: CPT | Performed by: NURSE PRACTITIONER

## 2024-04-05 ASSESSMENT — PAIN SCALES - GENERAL: PAINLEVEL: NO PAIN (0)

## 2024-04-05 NOTE — PROGRESS NOTES
Patient provide preoperative education, surgical packet discussed, Gatorade, and two bottles of soap (Hibiclens) provided to the patient. Patient has OB appointment on 24 with Dr. Aponte to discuss procedure and possible sterilization. Patient provided sterilization information booklet, clinic contact information for any questions or concerns, and patient signed consent for sterilization. No further questions or concerns at completion of education.       Per provider and patient, patient is undecieded on sterilization at this time, but consent/form completed in the event she chooses to proceed and was signed in the 30 day window today. Procedure for repeat  section and possible salpingectomy scheduled for 5/15/24.

## 2024-04-05 NOTE — PROGRESS NOTES
Doing well.  Baby active.  Denies cramping, bleeding, or leaking.  RCD signed and scheduled for 5/15/24.  Leaning toward salpingectomy so consent signed but remains unsure.  Plan APFT at 36 weeks for AMA. Return in 2 weeks.

## 2024-04-21 ENCOUNTER — HOSPITAL ENCOUNTER (INPATIENT)
Facility: HOSPITAL | Age: 39
Setting detail: SURGERY ADMIT
End: 2024-04-21
Attending: OBSTETRICS & GYNECOLOGY | Admitting: OBSTETRICS & GYNECOLOGY
Payer: COMMERCIAL

## 2024-04-21 ENCOUNTER — HOSPITAL ENCOUNTER (OUTPATIENT)
Facility: HOSPITAL | Age: 39
Discharge: OTHER INSTITUTION | End: 2024-04-21
Attending: OBSTETRICS & GYNECOLOGY | Admitting: OBSTETRICS & GYNECOLOGY
Payer: COMMERCIAL

## 2024-04-21 VITALS
OXYGEN SATURATION: 97 % | DIASTOLIC BLOOD PRESSURE: 61 MMHG | SYSTOLIC BLOOD PRESSURE: 133 MMHG | TEMPERATURE: 99.1 F | RESPIRATION RATE: 20 BRPM

## 2024-04-21 PROBLEM — Z36.89 ENCOUNTER FOR TRIAGE IN PREGNANT PATIENT: Status: ACTIVE | Noted: 2024-04-21

## 2024-04-21 LAB
AMPHETAMINES UR QL SCN: ABNORMAL
BARBITURATES UR QL SCN: ABNORMAL
BASOPHILS # BLD MANUAL: 0 10E3/UL (ref 0–0.2)
BASOPHILS NFR BLD MANUAL: 0 %
BENZODIAZ UR QL SCN: ABNORMAL
BZE UR QL SCN: ABNORMAL
CANNABINOIDS UR QL SCN: ABNORMAL
DACRYOCYTES BLD QL SMEAR: SLIGHT
EOSINOPHIL # BLD MANUAL: 0.3 10E3/UL (ref 0–0.7)
EOSINOPHIL NFR BLD MANUAL: 1 %
ERYTHROCYTE [DISTWIDTH] IN BLOOD BY AUTOMATED COUNT: 15.2 % (ref 10–15)
FENTANYL UR QL: ABNORMAL
FIBRINOGEN PPP-MCNC: 593 MG/DL (ref 170–490)
HCT VFR BLD AUTO: 31.6 % (ref 35–47)
HGB BLD-MCNC: 10.3 G/DL (ref 11.7–15.7)
HOLD SPECIMEN: NORMAL
HOLD SPECIMEN: NORMAL
LYMPHOCYTES # BLD MANUAL: 2.8 10E3/UL (ref 0.8–5.3)
LYMPHOCYTES NFR BLD MANUAL: 10 %
MCH RBC QN AUTO: 27.2 PG (ref 26.5–33)
MCHC RBC AUTO-ENTMCNC: 32.6 G/DL (ref 31.5–36.5)
MCV RBC AUTO: 83 FL (ref 78–100)
MONOCYTES # BLD MANUAL: 1.7 10E3/UL (ref 0–1.3)
MONOCYTES NFR BLD MANUAL: 6 %
NEUTROPHILS # BLD MANUAL: 23.3 10E3/UL (ref 1.6–8.3)
NEUTROPHILS NFR BLD MANUAL: 83 %
NRBC # BLD AUTO: 0 10E3/UL
NRBC BLD AUTO-RTO: 0 /100
OPIATES UR QL SCN: ABNORMAL
PCP QUAL URINE (ROCHE): ABNORMAL
PLAT MORPH BLD: ABNORMAL
PLATELET # BLD AUTO: 319 10E3/UL (ref 150–450)
RBC # BLD AUTO: 3.79 10E6/UL (ref 3.8–5.2)
RBC MORPH BLD: ABNORMAL
WBC # BLD AUTO: 28.1 10E3/UL (ref 4–11)

## 2024-04-21 PROCEDURE — G0463 HOSPITAL OUTPT CLINIC VISIT: HCPCS | Mod: 25

## 2024-04-21 PROCEDURE — 85384 FIBRINOGEN ACTIVITY: CPT | Performed by: OBSTETRICS & GYNECOLOGY

## 2024-04-21 PROCEDURE — 96374 THER/PROPH/DIAG INJ IV PUSH: CPT

## 2024-04-21 PROCEDURE — 250N000013 HC RX MED GY IP 250 OP 250 PS 637: Performed by: OBSTETRICS & GYNECOLOGY

## 2024-04-21 PROCEDURE — 99214 OFFICE O/P EST MOD 30 MIN: CPT | Performed by: OBSTETRICS & GYNECOLOGY

## 2024-04-21 PROCEDURE — 96372 THER/PROPH/DIAG INJ SC/IM: CPT | Mod: 59 | Performed by: OBSTETRICS & GYNECOLOGY

## 2024-04-21 PROCEDURE — 85007 BL SMEAR W/DIFF WBC COUNT: CPT | Performed by: OBSTETRICS & GYNECOLOGY

## 2024-04-21 PROCEDURE — 258N000003 HC RX IP 258 OP 636: Performed by: OBSTETRICS & GYNECOLOGY

## 2024-04-21 PROCEDURE — 85025 COMPLETE CBC W/AUTO DIFF WBC: CPT | Performed by: OBSTETRICS & GYNECOLOGY

## 2024-04-21 PROCEDURE — 87653 STREP B DNA AMP PROBE: CPT | Performed by: OBSTETRICS & GYNECOLOGY

## 2024-04-21 PROCEDURE — 250N000011 HC RX IP 250 OP 636: Performed by: OBSTETRICS & GYNECOLOGY

## 2024-04-21 PROCEDURE — 80307 DRUG TEST PRSMV CHEM ANLYZR: CPT | Performed by: OBSTETRICS & GYNECOLOGY

## 2024-04-21 PROCEDURE — 96360 HYDRATION IV INFUSION INIT: CPT

## 2024-04-21 PROCEDURE — 36415 COLL VENOUS BLD VENIPUNCTURE: CPT | Performed by: OBSTETRICS & GYNECOLOGY

## 2024-04-21 PROCEDURE — 96376 TX/PRO/DX INJ SAME DRUG ADON: CPT

## 2024-04-21 RX ORDER — PENICILLIN G POTASSIUM 5000000 [IU]/1
5 INJECTION, POWDER, FOR SOLUTION INTRAMUSCULAR; INTRAVENOUS ONCE
Status: COMPLETED | OUTPATIENT
Start: 2024-04-21 | End: 2024-04-21

## 2024-04-21 RX ORDER — MAGNESIUM SULFATE IN WATER 40 MG/ML
2 INJECTION, SOLUTION INTRAVENOUS CONTINUOUS
Status: DISCONTINUED | OUTPATIENT
Start: 2024-04-21 | End: 2024-04-21 | Stop reason: HOSPADM

## 2024-04-21 RX ORDER — NIFEDIPINE 10 MG/1
20 CAPSULE ORAL EVERY 30 MIN PRN
Status: DISCONTINUED | OUTPATIENT
Start: 2024-04-21 | End: 2024-04-21 | Stop reason: HOSPADM

## 2024-04-21 RX ORDER — NIFEDIPINE 10 MG/1
20 CAPSULE ORAL ONCE
Status: COMPLETED | OUTPATIENT
Start: 2024-04-21 | End: 2024-04-21

## 2024-04-21 RX ORDER — BETAMETHASONE SODIUM PHOSPHATE AND BETAMETHASONE ACETATE 3; 3 MG/ML; MG/ML
12 INJECTION, SUSPENSION INTRA-ARTICULAR; INTRALESIONAL; INTRAMUSCULAR; SOFT TISSUE EVERY 24 HOURS
Status: DISCONTINUED | OUTPATIENT
Start: 2024-04-21 | End: 2024-04-21 | Stop reason: HOSPADM

## 2024-04-21 RX ORDER — MAGNESIUM SULFATE HEPTAHYDRATE 40 MG/ML
2 INJECTION, SOLUTION INTRAVENOUS ONCE
Status: DISCONTINUED | OUTPATIENT
Start: 2024-04-21 | End: 2024-04-21 | Stop reason: HOSPADM

## 2024-04-21 RX ORDER — CALCIUM GLUCONATE 94 MG/ML
1 INJECTION, SOLUTION INTRAVENOUS
Status: DISCONTINUED | OUTPATIENT
Start: 2024-04-21 | End: 2024-04-21 | Stop reason: HOSPADM

## 2024-04-21 RX ORDER — PENICILLIN G 3000000 [IU]/50ML
3 INJECTION, SOLUTION INTRAVENOUS EVERY 4 HOURS
Status: DISCONTINUED | OUTPATIENT
Start: 2024-04-21 | End: 2024-04-21 | Stop reason: HOSPADM

## 2024-04-21 RX ORDER — MAGNESIUM SULFATE HEPTAHYDRATE 40 MG/ML
4 INJECTION, SOLUTION INTRAVENOUS ONCE
Status: DISCONTINUED | OUTPATIENT
Start: 2024-04-21 | End: 2024-04-21 | Stop reason: HOSPADM

## 2024-04-21 RX ORDER — LIDOCAINE 40 MG/G
CREAM TOPICAL
Status: DISCONTINUED | OUTPATIENT
Start: 2024-04-21 | End: 2024-04-21 | Stop reason: HOSPADM

## 2024-04-21 RX ADMIN — SODIUM CHLORIDE, POTASSIUM CHLORIDE, SODIUM LACTATE AND CALCIUM CHLORIDE 1000 ML: 600; 310; 30; 20 INJECTION, SOLUTION INTRAVENOUS at 03:21

## 2024-04-21 RX ADMIN — BETAMETHASONE SODIUM PHOSPHATE AND BETAMETHASONE ACETATE 12 MG: 3; 3 INJECTION, SUSPENSION INTRA-ARTICULAR; INTRALESIONAL; INTRAMUSCULAR at 04:09

## 2024-04-21 RX ADMIN — NIFEDIPINE 20 MG: 10 CAPSULE ORAL at 04:07

## 2024-04-21 RX ADMIN — PENICILLIN G POTASSIUM 5 MILLION UNITS: 5000000 POWDER, FOR SOLUTION INTRAMUSCULAR; INTRAPLEURAL; INTRATHECAL; INTRAVENOUS at 04:08

## 2024-04-21 ASSESSMENT — ACTIVITIES OF DAILY LIVING (ADL)
ADLS_ACUITY_SCORE: 36
ADLS_ACUITY_SCORE: 36

## 2024-04-21 NOTE — PROGRESS NOTES
Patient arrived ambulatory at 0250, complaint of vaginal bleeding and lower back pain, placed on fetal monitor noted fetal heart tones at 170-180's states vaginal bleed when wipes states low back pain palpated mild contractions, 3-4 min apart. Dr Marte notified to come in  and see patient, category 2 fetal monitor strip. Iv started LR bolus begun MD here did VE noted 3 cm, small amount of blood on glove with exam. Labs drawn UDS obtained,  Group B strep obtained, kept npo, medicated with PENG 5 million units, oral nifedipine,IM  betamethasone given, called for transport, Ambulance here at 0420. Report to team, Magnesium 6 gram loading dose to be given by team, left via cart at 0428. Report called to RN at [facility]. At 0432
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
## 2024-04-21 NOTE — PROGRESS NOTES
Provider notification:   Provider: sumi JOHNSON was notified at 0315  regarding: a persistent Category II fetal heart rate tracing for 15 minutes.    Category II Algorithm     Fetal heart rate and uterine activity reviewed with provider.    EFM interpretation suggests: concern for persistent Category II tracing due to: minimal variability.  EFM suggests no concern for interruption of the oxygen pathway..     Interventions to improve fetal oxygenation for a Category II tracing include: IV fluid bolus     After discussion with provider:Provider coming to bedside    Plan per provider / orders received for Lab draw, UDS ordered

## 2024-04-22 LAB — GP B STREP DNA SPEC QL NAA+PROBE: NEGATIVE

## 2024-04-23 ENCOUNTER — PREP FOR PROCEDURE (OUTPATIENT)
Dept: OBGYN | Facility: OTHER | Age: 39
End: 2024-04-23

## 2024-04-23 DIAGNOSIS — Z30.2 ENCOUNTER FOR STERILIZATION: Primary | ICD-10-CM

## 2024-04-23 DIAGNOSIS — Z98.891 HISTORY OF CESAREAN SECTION: ICD-10-CM

## 2024-04-25 ENCOUNTER — APPOINTMENT (OUTPATIENT)
Dept: ULTRASOUND IMAGING | Facility: HOSPITAL | Age: 39
End: 2024-04-25
Attending: STUDENT IN AN ORGANIZED HEALTH CARE EDUCATION/TRAINING PROGRAM
Payer: COMMERCIAL

## 2024-04-25 ENCOUNTER — HOSPITAL ENCOUNTER (EMERGENCY)
Facility: HOSPITAL | Age: 39
Discharge: HOME | End: 2024-04-25
Attending: STUDENT IN AN ORGANIZED HEALTH CARE EDUCATION/TRAINING PROGRAM | Admitting: STUDENT IN AN ORGANIZED HEALTH CARE EDUCATION/TRAINING PROGRAM
Payer: COMMERCIAL

## 2024-04-25 VITALS
WEIGHT: 220 LBS | HEIGHT: 65 IN | OXYGEN SATURATION: 96 % | SYSTOLIC BLOOD PRESSURE: 132 MMHG | BODY MASS INDEX: 36.65 KG/M2 | TEMPERATURE: 98.1 F | RESPIRATION RATE: 18 BRPM | HEART RATE: 98 BPM | DIASTOLIC BLOOD PRESSURE: 83 MMHG

## 2024-04-25 DIAGNOSIS — K82.8 GALLBLADDER SLUDGE: ICD-10-CM

## 2024-04-25 DIAGNOSIS — R10.11 RUQ ABDOMINAL PAIN: ICD-10-CM

## 2024-04-25 LAB
ALBUMIN SERPL BCG-MCNC: 2.7 G/DL (ref 3.5–5.2)
ALP SERPL-CCNC: 168 U/L (ref 40–150)
ALT SERPL W P-5'-P-CCNC: 11 U/L (ref 0–50)
ANION GAP SERPL CALCULATED.3IONS-SCNC: 12 MMOL/L (ref 7–15)
AST SERPL W P-5'-P-CCNC: 17 U/L (ref 0–45)
BASOPHILS # BLD MANUAL: 0 10E3/UL (ref 0–0.2)
BASOPHILS NFR BLD MANUAL: 0 %
BILIRUB SERPL-MCNC: 0.6 MG/DL
BUN SERPL-MCNC: 13.3 MG/DL (ref 6–20)
CALCIUM SERPL-MCNC: 8.4 MG/DL (ref 8.6–10)
CHLORIDE SERPL-SCNC: 106 MMOL/L (ref 98–107)
CREAT SERPL-MCNC: 0.45 MG/DL (ref 0.51–0.95)
DEPRECATED HCO3 PLAS-SCNC: 21 MMOL/L (ref 22–29)
EGFRCR SERPLBLD CKD-EPI 2021: >90 ML/MIN/1.73M2
EOSINOPHIL # BLD MANUAL: 0 10E3/UL (ref 0–0.7)
EOSINOPHIL NFR BLD MANUAL: 0 %
ERYTHROCYTE [DISTWIDTH] IN BLOOD BY AUTOMATED COUNT: 15.4 % (ref 10–15)
GLUCOSE SERPL-MCNC: 109 MG/DL (ref 70–99)
HCT VFR BLD AUTO: 27.5 % (ref 35–47)
HGB BLD-MCNC: 9 G/DL (ref 11.7–15.7)
HOLD SPECIMEN: NORMAL
HOLD SPECIMEN: NORMAL
LIPASE SERPL-CCNC: 11 U/L (ref 13–60)
LYMPHOCYTES # BLD MANUAL: 1.8 10E3/UL (ref 0.8–5.3)
LYMPHOCYTES NFR BLD MANUAL: 9 %
MCH RBC QN AUTO: 27.2 PG (ref 26.5–33)
MCHC RBC AUTO-ENTMCNC: 32.7 G/DL (ref 31.5–36.5)
MCV RBC AUTO: 83 FL (ref 78–100)
METAMYELOCYTES # BLD MANUAL: 0.2 10E3/UL
METAMYELOCYTES NFR BLD MANUAL: 1 %
MONOCYTES # BLD MANUAL: 0.6 10E3/UL (ref 0–1.3)
MONOCYTES NFR BLD MANUAL: 3 %
MYELOCYTES # BLD MANUAL: 0.4 10E3/UL
MYELOCYTES NFR BLD MANUAL: 2 %
NEUTROPHILS # BLD MANUAL: 16.7 10E3/UL (ref 1.6–8.3)
NEUTROPHILS NFR BLD MANUAL: 85 %
NRBC # BLD AUTO: 0 10E3/UL
NRBC BLD AUTO-RTO: 0 /100
PLAT MORPH BLD: ABNORMAL
PLATELET # BLD AUTO: 444 10E3/UL (ref 150–450)
POTASSIUM SERPL-SCNC: 3.7 MMOL/L (ref 3.4–5.3)
PROT SERPL-MCNC: 6.2 G/DL (ref 6.4–8.3)
RBC # BLD AUTO: 3.31 10E6/UL (ref 3.8–5.2)
RBC MORPH BLD: ABNORMAL
SODIUM SERPL-SCNC: 139 MMOL/L (ref 135–145)
WBC # BLD AUTO: 19.6 10E3/UL (ref 4–11)

## 2024-04-25 PROCEDURE — 76705 ECHO EXAM OF ABDOMEN: CPT

## 2024-04-25 PROCEDURE — 99284 EMERGENCY DEPT VISIT MOD MDM: CPT | Performed by: STUDENT IN AN ORGANIZED HEALTH CARE EDUCATION/TRAINING PROGRAM

## 2024-04-25 PROCEDURE — 82040 ASSAY OF SERUM ALBUMIN: CPT | Performed by: STUDENT IN AN ORGANIZED HEALTH CARE EDUCATION/TRAINING PROGRAM

## 2024-04-25 PROCEDURE — 85007 BL SMEAR W/DIFF WBC COUNT: CPT | Performed by: STUDENT IN AN ORGANIZED HEALTH CARE EDUCATION/TRAINING PROGRAM

## 2024-04-25 PROCEDURE — 85004 AUTOMATED DIFF WBC COUNT: CPT | Performed by: STUDENT IN AN ORGANIZED HEALTH CARE EDUCATION/TRAINING PROGRAM

## 2024-04-25 PROCEDURE — 36415 COLL VENOUS BLD VENIPUNCTURE: CPT | Performed by: STUDENT IN AN ORGANIZED HEALTH CARE EDUCATION/TRAINING PROGRAM

## 2024-04-25 PROCEDURE — 99284 EMERGENCY DEPT VISIT MOD MDM: CPT | Mod: 25

## 2024-04-25 PROCEDURE — 83690 ASSAY OF LIPASE: CPT | Performed by: STUDENT IN AN ORGANIZED HEALTH CARE EDUCATION/TRAINING PROGRAM

## 2024-04-25 PROCEDURE — 84155 ASSAY OF PROTEIN SERUM: CPT | Performed by: STUDENT IN AN ORGANIZED HEALTH CARE EDUCATION/TRAINING PROGRAM

## 2024-04-25 RX ORDER — CEPHALEXIN 500 MG/1
500 CAPSULE ORAL 3 TIMES DAILY
Qty: 21 CAPSULE | Refills: 0 | Status: SHIPPED | OUTPATIENT
Start: 2024-04-25 | End: 2024-05-02

## 2024-04-25 RX ORDER — ACETAMINOPHEN 325 MG/1
975 TABLET ORAL ONCE
Status: COMPLETED | OUTPATIENT
Start: 2024-04-25 | End: 2024-04-25

## 2024-04-25 ASSESSMENT — ACTIVITIES OF DAILY LIVING (ADL)
ADLS_ACUITY_SCORE: 34
ADLS_ACUITY_SCORE: 36
ADLS_ACUITY_SCORE: 36

## 2024-04-25 ASSESSMENT — COLUMBIA-SUICIDE SEVERITY RATING SCALE - C-SSRS
1. IN THE PAST MONTH, HAVE YOU WISHED YOU WERE DEAD OR WISHED YOU COULD GO TO SLEEP AND NOT WAKE UP?: NO
2. HAVE YOU ACTUALLY HAD ANY THOUGHTS OF KILLING YOURSELF IN THE PAST MONTH?: NO
6. HAVE YOU EVER DONE ANYTHING, STARTED TO DO ANYTHING, OR PREPARED TO DO ANYTHING TO END YOUR LIFE?: NO

## 2024-04-25 NOTE — DISCHARGE INSTRUCTIONS
Return to the ER for worsening symptoms or new concerning symptoms. Take the antibiotics as directed the goal is to prevent infection, we do not think that there currently is an infection.  You should continue to allow the incision to drain.  Follow-up in 1 week with Dr. Aponte for wound check.  Follow-up with surgery for evaluation of your gallbladder.

## 2024-04-25 NOTE — ED TRIAGE NOTES
Patient arrived by private auto with Post op problem,  5 days ago incision has been clean dry intact until this morning @ 0620 patient noted serosanguinous drainage from incision and is unsure if it dehisced.

## 2024-04-25 NOTE — ED NOTES
Patient reports drainage from surgical incision from . Wound appears to be healing well, no open areas, surgical incision intact. Patient does endorse RUQ pain. Patient reports that taking tylenol at 0530 AM

## 2024-04-25 NOTE — ED PROVIDER NOTES
"[facility]  ED Provider Note    Chief Complaint   Patient presents with    Post-op Problem     History:  Alfreda Cortez is a 38 year old female with history of anxiety and trichomoniasis during pregnancy presents to the emergency department today complaining of lower abdominal pain as well as right upper quadrant abdominal pain. The RUQ pain has been there since before the C-sxn.  She states lower abdominal pain is continue to improve since her .    Review of Systems   Performed; see HPI for pertinent positives and negatives.     Medical history, surgical history, and social history was reviewed.  Nursing documentation, triage note, and vitals were reviewed.    Vitals:  BP: 132/83  Pulse: 98  Temp: 98.1  F (36.7  C)  Resp: 18  Height: 165.1 cm (5' 5\")  Weight: 99.8 kg (220 lb)  SpO2: 96 %    Physical Exam:  Constitutional: Alert and conversant. NAD   HENT: NCAT   Eyes: Normal pupils   Neck: supple   CV: No pallor  Pulmonary/Chest: Non-labored respirations  Abdominal: non-distended right upper quadrant tenderness to palpation with positive Ayala sign  MSK: FLORES.   Neuro: Alert and appropriate   Skin: Warm and dry. No diaphoresis.     Drainage is serosanguineous, no pus, no excessive heat  Psych: Appropriate mood and affect       MDM:      ED Course as of 24 1135   Thu 2024   1124 38-year-old female here with concern for wound dehiscence.  No evidence of infection.  No pus coming out of the wound but has dehisced slightly.  Case discussed with the obstetrician who recommends allowing it to continue to drain, prophylactic antibiotics and follow-up in 1 week for wound check.  He will arrange for follow-up.  Her abdominal exam is notable for positive Ayala sign.  She does have a leukocytosis but significantly improved since previous.  No fevers.  The pain is intermittent.  Ultrasound without findings of acute cholecystitis but there is gallbladder sludge.  Some free fluid, " however, recent surgery explains the presence.   1133 Will give her surgery follow-up for what appears to be symptomatic cholelithiasis.  Strict return precautions given.  She will require follow-up.  She will see the obstetrician in a week and can have a repeat exam at that time.  Patient discharged in stable condition all questions answered       Procedures:  Procedures    Impression:  Final diagnoses:   RUQ abdominal pain   Gallbladder sludge           Anthony Zabala MD  04/25/24 1132

## 2024-04-29 ENCOUNTER — TELEPHONE (OUTPATIENT)
Dept: OBGYN | Facility: OTHER | Age: 39
End: 2024-04-29

## 2024-04-29 NOTE — TELEPHONE ENCOUNTER
Returned the patient's call via phone, verified name and date of birth, and confirmed patient states she needs a appointment. Patient states she was informed she needed to see Dr. Aponte by 5/2/24. Patient informed she had a 1300 appointment with Dr. Aponte and wondering if we could change her to 1330 on same date, 4/30/24. Patient ok with the appointment date and time (4/30/24 at 1330 with Dr. Aponte). Patient states she is glad she called, as she was not aware appointment already was made. Patient states she delivered on 4/21/24 via C/S. Patient appointment is for incision check per appointment notes. Patient aware and no further questions or concerns at call completion.     Noted that the patient needs 2 week and 6 week post partum, no appointments seen in chart review for patient. Note left in appointment notes for patient to make follow up appointments at appointment.      Patient reached at: [phone]

## 2024-06-17 ENCOUNTER — MEDICAL CORRESPONDENCE (OUTPATIENT)
Dept: HEALTH INFORMATION MANAGEMENT | Facility: HOSPITAL | Age: 39
End: 2024-06-17

## 2024-08-27 ENCOUNTER — APPOINTMENT (OUTPATIENT)
Dept: GENERAL RADIOLOGY | Facility: HOSPITAL | Age: 39
End: 2024-08-27
Attending: EMERGENCY MEDICINE
Payer: COMMERCIAL

## 2024-08-27 ENCOUNTER — APPOINTMENT (OUTPATIENT)
Dept: CT IMAGING | Facility: HOSPITAL | Age: 39
End: 2024-08-27
Attending: EMERGENCY MEDICINE
Payer: COMMERCIAL

## 2024-08-27 ENCOUNTER — HOSPITAL ENCOUNTER (EMERGENCY)
Facility: HOSPITAL | Age: 39
Discharge: HOME | End: 2024-08-28
Attending: EMERGENCY MEDICINE | Admitting: EMERGENCY MEDICINE
Payer: COMMERCIAL

## 2024-08-27 DIAGNOSIS — S82.254A CLOSED NONDISPLACED COMMINUTED FRACTURE OF SHAFT OF RIGHT TIBIA, INITIAL ENCOUNTER: ICD-10-CM

## 2024-08-27 PROCEDURE — 96374 THER/PROPH/DIAG INJ IV PUSH: CPT

## 2024-08-27 PROCEDURE — 72170 X-RAY EXAM OF PELVIS: CPT

## 2024-08-27 PROCEDURE — 72125 CT NECK SPINE W/O DYE: CPT

## 2024-08-27 PROCEDURE — 99284 EMERGENCY DEPT VISIT MOD MDM: CPT | Mod: 25 | Performed by: EMERGENCY MEDICINE

## 2024-08-27 PROCEDURE — 73590 X-RAY EXAM OF LOWER LEG: CPT | Mod: RT

## 2024-08-27 PROCEDURE — 99285 EMERGENCY DEPT VISIT HI MDM: CPT | Mod: 25

## 2024-08-27 PROCEDURE — 73610 X-RAY EXAM OF ANKLE: CPT | Mod: RT

## 2024-08-27 PROCEDURE — 71045 X-RAY EXAM CHEST 1 VIEW: CPT

## 2024-08-27 PROCEDURE — 271N000006 HC CAST/SPLINT FIBERGLASS

## 2024-08-27 PROCEDURE — 250N000011 HC RX IP 250 OP 636: Performed by: EMERGENCY MEDICINE

## 2024-08-27 PROCEDURE — 73630 X-RAY EXAM OF FOOT: CPT | Mod: RT

## 2024-08-27 PROCEDURE — 70450 CT HEAD/BRAIN W/O DYE: CPT

## 2024-08-27 RX ORDER — MORPHINE SULFATE 4 MG/ML
4 INJECTION, SOLUTION INTRAMUSCULAR; INTRAVENOUS ONCE
Status: COMPLETED | OUTPATIENT
Start: 2024-08-27 | End: 2024-08-27

## 2024-08-27 RX ADMIN — MORPHINE SULFATE 4 MG: 4 INJECTION, SOLUTION INTRAMUSCULAR; INTRAVENOUS at 23:03

## 2024-08-27 ASSESSMENT — ACTIVITIES OF DAILY LIVING (ADL): ADLS_ACUITY_SCORE: 36

## 2024-08-27 ASSESSMENT — COLUMBIA-SUICIDE SEVERITY RATING SCALE - C-SSRS
1. IN THE PAST MONTH, HAVE YOU WISHED YOU WERE DEAD OR WISHED YOU COULD GO TO SLEEP AND NOT WAKE UP?: NO
6. HAVE YOU EVER DONE ANYTHING, STARTED TO DO ANYTHING, OR PREPARED TO DO ANYTHING TO END YOUR LIFE?: YES
2. HAVE YOU ACTUALLY HAD ANY THOUGHTS OF KILLING YOURSELF IN THE PAST MONTH?: NO

## 2024-08-28 VITALS
RESPIRATION RATE: 26 BRPM | OXYGEN SATURATION: 98 % | TEMPERATURE: 98.1 F | DIASTOLIC BLOOD PRESSURE: 78 MMHG | HEART RATE: 76 BPM | SYSTOLIC BLOOD PRESSURE: 125 MMHG

## 2024-08-28 PROCEDURE — 29505 APPLICATION LONG LEG SPLINT: CPT | Mod: RT | Performed by: EMERGENCY MEDICINE

## 2024-08-28 PROCEDURE — 29505 APPLICATION LONG LEG SPLINT: CPT | Mod: RT

## 2024-08-28 PROCEDURE — 96375 TX/PRO/DX INJ NEW DRUG ADDON: CPT

## 2024-08-28 PROCEDURE — 250N000011 HC RX IP 250 OP 636: Performed by: EMERGENCY MEDICINE

## 2024-08-28 PROCEDURE — 96376 TX/PRO/DX INJ SAME DRUG ADON: CPT | Mod: XU

## 2024-08-28 RX ORDER — FENTANYL CITRATE 50 UG/ML
50 INJECTION, SOLUTION INTRAMUSCULAR; INTRAVENOUS ONCE
Status: COMPLETED | OUTPATIENT
Start: 2024-08-28 | End: 2024-08-28

## 2024-08-28 RX ORDER — HYDROCODONE BITARTRATE AND ACETAMINOPHEN 5; 325 MG/1; MG/1
1 TABLET ORAL EVERY 6 HOURS PRN
Qty: 10 TABLET | Refills: 0 | Status: SHIPPED | OUTPATIENT
Start: 2024-08-28 | End: 2024-08-31

## 2024-08-28 RX ORDER — FENTANYL CITRATE 50 UG/ML
25 INJECTION, SOLUTION INTRAMUSCULAR; INTRAVENOUS ONCE
Status: COMPLETED | OUTPATIENT
Start: 2024-08-28 | End: 2024-08-28

## 2024-08-28 RX ADMIN — FENTANYL CITRATE 50 MCG: 50 INJECTION INTRAMUSCULAR; INTRAVENOUS at 01:03

## 2024-08-28 RX ADMIN — FENTANYL CITRATE 25 MCG: 50 INJECTION INTRAMUSCULAR; INTRAVENOUS at 02:04

## 2024-08-28 ASSESSMENT — ACTIVITIES OF DAILY LIVING (ADL)
ADLS_ACUITY_SCORE: 36
ADLS_ACUITY_SCORE: 36
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
## 2024-09-24 ENCOUNTER — MEDICAL CORRESPONDENCE (OUTPATIENT)
Dept: HEALTH INFORMATION MANAGEMENT | Facility: HOSPITAL | Age: 39
End: 2024-09-24

## 2025-01-17 ENCOUNTER — HOSPITAL ENCOUNTER (EMERGENCY)
Facility: HOSPITAL | Age: 40
Discharge: HOME | End: 2025-01-17
Attending: PHYSICIAN ASSISTANT | Admitting: PHYSICIAN ASSISTANT
Payer: COMMERCIAL

## 2025-01-17 VITALS
RESPIRATION RATE: 16 BRPM | HEART RATE: 81 BPM | OXYGEN SATURATION: 100 % | DIASTOLIC BLOOD PRESSURE: 85 MMHG | SYSTOLIC BLOOD PRESSURE: 125 MMHG | TEMPERATURE: 97.5 F

## 2025-01-17 DIAGNOSIS — H10.32 ACUTE CONJUNCTIVITIS OF LEFT EYE, UNSPECIFIED ACUTE CONJUNCTIVITIS TYPE: ICD-10-CM

## 2025-01-17 PROCEDURE — G0463 HOSPITAL OUTPT CLINIC VISIT: HCPCS

## 2025-01-17 PROCEDURE — 250N000009 HC RX 250: Performed by: PHYSICIAN ASSISTANT

## 2025-01-17 PROCEDURE — 99213 OFFICE O/P EST LOW 20 MIN: CPT | Performed by: PHYSICIAN ASSISTANT

## 2025-01-17 RX ORDER — POLYMYXIN B SULFATE AND TRIMETHOPRIM 1; 10000 MG/ML; [USP'U]/ML
1-2 SOLUTION OPHTHALMIC EVERY 4 HOURS
Qty: 10 ML | Refills: 0 | Status: SHIPPED | OUTPATIENT
Start: 2025-01-17 | End: 2025-01-24

## 2025-01-17 RX ORDER — TETRACAINE HYDROCHLORIDE 5 MG/ML
1-2 SOLUTION OPHTHALMIC ONCE
Status: COMPLETED | OUTPATIENT
Start: 2025-01-17 | End: 2025-01-17

## 2025-01-17 RX ADMIN — TETRACAINE HYDROCHLORIDE 2 DROP: 5 SOLUTION OPHTHALMIC at 19:35

## 2025-01-17 RX ADMIN — FLUORESCEIN SODIUM 1 STRIP: 1 STRIP OPHTHALMIC at 19:35

## 2025-01-17 ASSESSMENT — COLUMBIA-SUICIDE SEVERITY RATING SCALE - C-SSRS
6. HAVE YOU EVER DONE ANYTHING, STARTED TO DO ANYTHING, OR PREPARED TO DO ANYTHING TO END YOUR LIFE?: NO
1. IN THE PAST MONTH, HAVE YOU WISHED YOU WERE DEAD OR WISHED YOU COULD GO TO SLEEP AND NOT WAKE UP?: NO
2. HAVE YOU ACTUALLY HAD ANY THOUGHTS OF KILLING YOURSELF IN THE PAST MONTH?: NO

## 2025-01-17 ASSESSMENT — ENCOUNTER SYMPTOMS
FEVER: 0
EYE DISCHARGE: 1
EYE ITCHING: 1
EYE REDNESS: 1

## 2025-01-17 ASSESSMENT — VISUAL ACUITY
OD: 20/25
OS: 20/25

## 2025-01-18 NOTE — ED PROVIDER NOTES
History     Chief Complaint   Patient presents with    Eye Problem     HPI  Alfreda Cortez is a 39 year old female who presents to urgent care with concern of left eye redness, irritation, discharge.  She noticed this over the last 1 to 2 days.  She wonders if there may be a scratch on the eye as one area appears to be more reddened.  However she denies any known injury.  She denies vision changes.  She does have an infant at home, however the infant has not actively had conjunctivitis.  Patient herself was treated for right conjunctivitis about 2 weeks ago.    Allergies:  No Known Allergies    Problem List:    Patient Active Problem List    Diagnosis Date Noted    Encounter for triage in pregnant patient 2024     Priority: Medium    High risk pregnancy, multigravida of advanced maternal age in first trimester 2023     Priority: Medium     Prior CD, counseled, desires repeat  NIPT  AMA  Trichomonas +  Breast  Antonich      Trichomonal vaginitis during pregnancy 2023     Priority: Medium    JESSI (generalized anxiety disorder) 2022     Priority: Medium    Moderate episode of recurrent major depressive disorder (H) 2022     Priority: Medium    Hypovitaminosis D 2022     Priority: Medium        Past Medical History:    Past Medical History:   Diagnosis Date    Bipolar 1 disorder, manic, moderate (H)     Cannabis abuse     JESSI (generalized anxiety disorder)     Moderate episode of recurrent major depressive disorder (H)     PTSD (post-traumatic stress disorder)     Tobacco abuse        Past Surgical History:    Past Surgical History:   Procedure Laterality Date     SECTION N/A 2022    Procedure:  SECTION;  Surgeon: Gagandeep Aponte MD;  Location: [facility]    wisdom teeth extraction Bilateral     upper abd lower       Family History:    Family History   Problem Relation Age of Onset    No Known Problems Mother     Lung Cancer Father 79    No Known Problems Sister      No Known Problems Sister     No Known Problems Sister     Mental Illness Brother     No Known Problems Brother     No Known Problems Maternal Grandmother     No Known Problems Maternal Grandfather     No Known Problems Paternal Grandmother     No Known Problems Paternal Grandfather     Lung Cancer Maternal Aunt     No Known Problems Half-Brother     Lupus Half-Sister        Social History:  Marital Status:  Single [1]  Social History     Tobacco Use    Smoking status: Every Day     Types: Vaping Device    Smokeless tobacco: Never   Vaping Use    Vaping status: Former    Start date: 1/1/2020    Quit date: 1/1/2021    Substances: Nicotine, Flavoring    Devices: Disposable, Refillable tank   Substance Use Topics    Alcohol use: Not Currently     Comment: Last use in October    Drug use: Yes     Types: Marijuana     Comment: yesterday        Medications:    polymixin b-trimethoprim (POLYTRIM) 65013-4.1 UNIT/ML-% ophthalmic solution  Prenatal Vit-Fe Fumarate-FA (PRENATAL MULTIVITAMIN W/IRON) 27-0.8 MG tablet          Review of Systems   Constitutional:  Negative for fever.   Eyes:  Positive for discharge, redness and itching.   Allergic/Immunologic: Negative for immunocompromised state.       Physical Exam   BP: 125/85  Pulse: 81  Temp: 97.5  F (36.4  C)  Resp: 16  SpO2: 100 %      Physical Exam  Vitals and nursing note reviewed.   Constitutional:       General: She is not in acute distress.     Appearance: Normal appearance. She is not toxic-appearing.   Eyes:      General: Lids are normal. Lids are everted, no foreign bodies appreciated. No visual field deficit.        Left eye: Discharge (clear) present.No foreign body.      Extraocular Movements:      Left eye: Normal extraocular motion and no nystagmus.      Conjunctiva/sclera:      Left eye: Left conjunctiva is injected. No chemosis, exudate or hemorrhage.     Pupils:      Left eye: Pupil is round, reactive and not sluggish. No corneal abrasion or fluorescein  uptake.   Cardiovascular:      Rate and Rhythm: Normal rate and regular rhythm.   Skin:     Findings: No rash.   Neurological:      General: No focal deficit present.      Mental Status: She is alert and oriented to person, place, and time.         ED Course        Procedures              No results found for this or any previous visit (from the past 24 hours).    Medications   tetracaine (PONTOCAINE) 0.5 % ophthalmic solution 1-2 drop (2 drops Left Eye $Given 1/17/25 1935)   fluorescein (FUL-COSNTANZA) ophthalmic strip 1 strip (1 strip Left Eye $Given 1/17/25 1935)       Assessments & Plan (with Medical Decision Making)     I have reviewed the nursing notes.    I have reviewed the findings, diagnosis, plan and need for follow up with the patient.    39-year-old female presents with around a day of left eye redness, discharge, irritation.  No known injury.  Her visual acuity was intact.  Examination revealed scant light yellow discharge, mostly with clear tearing.  No fluorescein uptake.  Discussed with Alfreda that this may represent viral versus bacterial conjunctivitis.  However, given her infant at home and recent right conjunctivitis treated with antibiotic drops, will start her on polymyxin trimethoprim drops.  Discussed the importance of good hand hygiene to limit transmission.  May return to work 24 hours after starting antibiotics.  She was in agreement and understanding.        Discharge Medication List as of 1/17/2025  7:41 PM        START taking these medications    Details   polymixin b-trimethoprim (POLYTRIM) 96445-7.1 UNIT/ML-% ophthalmic solution Place 1-2 drops into both eyes every 4 hours for 7 days., Disp-10 mL, R-0, [facility]             Final diagnoses:   Acute conjunctivitis of left eye, unspecified acute conjunctivitis type       1/17/2025   [facility]       Thais Castillo PA-C  01/17/25 2039

## 2025-02-13 NOTE — PROGRESS NOTES
Doing well, no questions  +mvmt around 16 wks  Needs repeat ultrasound to get better look at the heart  And follow-up 3 wks  gct and cbc next appt  Progesterone next week  
No indicators present

## (undated) DEVICE — IRRIGATION-NACL 1000ML

## (undated) DEVICE — IRRIGATION-H2O 1000ML

## (undated) DEVICE — SYRINGE-ASEPTO IRRIGATION

## (undated) DEVICE — SCD SLEEVE-KNEE REG.

## (undated) DEVICE — CANISTER-SUCTION 2000CC

## (undated) DEVICE — Device

## (undated) DEVICE — CATH TRAY-16FR METER W/STATLOCK LATEX]

## (undated) DEVICE — SUTURE-VICRYL 0 CT J358H

## (undated) DEVICE — LIGHT HANDLE COVER FOR SKYTRON LIGHTS

## (undated) DEVICE — SUTURE-PDS II 0 CTX Z370T

## (undated) DEVICE — PACK-BASIN SET-UP

## (undated) DEVICE — TRAY-SKIN PREP DRY

## (undated) DEVICE — SUTURE-CHROMIC GUT 1 CTX 905H

## (undated) DEVICE — FLUID TRAP FOR VIROSAFE FILTERS

## (undated) DEVICE — GLV-8.5 BIOGEL LATEX

## (undated) DEVICE — APPLICATOR-CHLORAPREP 26ML TINTED CHG 2%+ 70% IPA-SURGICAL

## (undated) DEVICE — PACK-C-SECTION-CUSTOM

## (undated) DEVICE — GOWN-SURG XL LVL 3 REINFORCED

## (undated) DEVICE — DRSG-SPONGE STERILE 8 X 4

## (undated) DEVICE — LABEL-STERILE PREPRINTED FOR OR

## (undated) DEVICE — TAPE-MEDIPORE 4" X 2YD

## (undated) DEVICE — CAUTERY PAD-POLYHESIVE II ADULT

## (undated) RX ORDER — OXYTOCIN/0.9 % SODIUM CHLORIDE 30/500 ML
PLASTIC BAG, INJECTION (ML) INTRAVENOUS
Status: DISPENSED
Start: 2022-08-09

## (undated) RX ORDER — MISOPROSTOL 200 UG/1
TABLET ORAL
Status: DISPENSED
Start: 2022-08-09

## (undated) RX ORDER — CEFAZOLIN SODIUM 1 G/3ML
INJECTION, POWDER, FOR SOLUTION INTRAMUSCULAR; INTRAVENOUS
Status: DISPENSED
Start: 2022-08-09

## (undated) RX ORDER — PROPOFOL 10 MG/ML
INJECTION, EMULSION INTRAVENOUS
Status: DISPENSED
Start: 2022-08-09

## (undated) RX ORDER — FENTANYL CITRATE-0.9 % NACL/PF 10 MCG/ML
PLASTIC BAG, INJECTION (ML) INTRAVENOUS
Status: DISPENSED
Start: 2022-08-09

## (undated) RX ORDER — LIDOCAINE HYDROCHLORIDE 20 MG/ML
INJECTION, SOLUTION EPIDURAL; INFILTRATION; INTRACAUDAL; PERINEURAL
Status: DISPENSED
Start: 2022-08-09

## (undated) RX ORDER — AZITHROMYCIN 500 MG/1
INJECTION, POWDER, LYOPHILIZED, FOR SOLUTION INTRAVENOUS
Status: DISPENSED
Start: 2022-08-09

## (undated) RX ORDER — FENTANYL CITRATE 50 UG/ML
INJECTION, SOLUTION INTRAMUSCULAR; INTRAVENOUS
Status: DISPENSED
Start: 2022-08-09